# Patient Record
Sex: FEMALE | Race: BLACK OR AFRICAN AMERICAN | Employment: FULL TIME | URBAN - METROPOLITAN AREA
[De-identification: names, ages, dates, MRNs, and addresses within clinical notes are randomized per-mention and may not be internally consistent; named-entity substitution may affect disease eponyms.]

---

## 2017-07-11 ENCOUNTER — OFFICE VISIT (OUTPATIENT)
Dept: FAMILY MEDICINE CLINIC | Age: 32
End: 2017-07-11

## 2017-07-11 VITALS
TEMPERATURE: 98 F | DIASTOLIC BLOOD PRESSURE: 90 MMHG | RESPIRATION RATE: 18 BRPM | HEART RATE: 98 BPM | WEIGHT: 142 LBS | SYSTOLIC BLOOD PRESSURE: 148 MMHG | OXYGEN SATURATION: 97 % | HEIGHT: 66 IN | BODY MASS INDEX: 22.82 KG/M2

## 2017-07-11 DIAGNOSIS — R82.90 ABNORMAL URINALYSIS: ICD-10-CM

## 2017-07-11 DIAGNOSIS — N89.8 VAGINAL ITCHING: Primary | ICD-10-CM

## 2017-07-11 LAB
BILIRUB UR QL STRIP: NEGATIVE
GLUCOSE UR-MCNC: NEGATIVE MG/DL
HCG URINE, QL. (POC): NEGATIVE
KETONES P FAST UR STRIP-MCNC: NEGATIVE MG/DL
PH UR STRIP: 5.5 [PH] (ref 4.6–8)
PROT UR QL STRIP: ABNORMAL MG/DL
SP GR UR STRIP: 1.02 (ref 1–1.03)
UA UROBILINOGEN AMB POC: ABNORMAL (ref 0.2–1)
URINALYSIS CLARITY POC: ABNORMAL
URINALYSIS COLOR POC: ABNORMAL
URINE BLOOD POC: ABNORMAL
URINE LEUKOCYTES POC: ABNORMAL
URINE NITRITES POC: NEGATIVE
VALID INTERNAL CONTROL?: YES

## 2017-07-11 NOTE — PROGRESS NOTES
HISTORY OF PRESENT ILLNESS  Lowell An is a 34 y.o. female. HPI   This lady states she has had occasional vaginal itching and wants to be \"checked for BV\"  No other complaints  Denies abdominal pain and discomfort    Review of Systems   Constitutional: Negative for chills and fever. Respiratory: Negative for shortness of breath. Gastrointestinal: Negative for abdominal pain, nausea and vomiting. Musculoskeletal: Negative for myalgias. Physical Exam   Constitutional: She appears well-developed and well-nourished. No distress. Neck: Normal range of motion. Cardiovascular: Normal rate, regular rhythm and normal heart sounds. No murmur heard. Pulmonary/Chest: Effort normal and breath sounds normal. No respiratory distress. She has no wheezes. Abdominal: Soft. There is no tenderness. Genitourinary:   Genitourinary Comments: defered   Skin: She is not diaphoretic. ASSESSMENT and PLAN    ICD-10-CM ICD-9-CM    1. Vaginal itching L29.8 698.1 AMB POC URINALYSIS DIP STICK MANUAL W/O MICRO      NUSWAB VAGINITIS PLUS      AMB POC URINE PREGNANCY TEST, VISUAL COLOR COMPARISON   2.  Abnormal urinalysis R82.90 791.9 CULTURE, URINE   will follow up results  Precautions given

## 2017-07-13 LAB — BACTERIA UR CULT: NORMAL

## 2017-07-14 LAB
A VAGINAE DNA VAG QL NAA+PROBE: NORMAL SCORE
BVAB2 DNA VAG QL NAA+PROBE: NORMAL SCORE
C ALBICANS DNA VAG QL NAA+PROBE: NEGATIVE
C GLABRATA DNA VAG QL NAA+PROBE: NEGATIVE
C TRACH RRNA SPEC QL NAA+PROBE: NEGATIVE
MEGA1 DNA VAG QL NAA+PROBE: NORMAL SCORE
N GONORRHOEA RRNA SPEC QL NAA+PROBE: NEGATIVE
T VAGINALIS RRNA SPEC QL NAA+PROBE: NEGATIVE

## 2017-07-17 ENCOUNTER — TELEPHONE (OUTPATIENT)
Dept: FAMILY MEDICINE CLINIC | Age: 32
End: 2017-07-17

## 2017-07-17 NOTE — TELEPHONE ENCOUNTER
Called patient on number listed to give results of tests done per Dr. Naomy Maya, unable to leave message at this time because mailbox is full.   Cecilia Degroot LPN

## 2017-07-17 NOTE — TELEPHONE ENCOUNTER
----- Message from Tatyana Glover MD sent at 7/16/2017  6:36 PM EDT -----  Please notify pt of negative results

## 2019-01-17 PROBLEM — R74.8 ELEVATED LIVER ENZYMES: Status: ACTIVE | Noted: 2018-11-28

## 2020-06-05 ENCOUNTER — OFFICE VISIT (OUTPATIENT)
Dept: PRIMARY CARE CLINIC | Age: 35
End: 2020-06-05

## 2020-06-05 DIAGNOSIS — Z20.828 EXPOSURE TO SARS-ASSOCIATED CORONAVIRUS: Primary | ICD-10-CM

## 2020-06-05 NOTE — PROGRESS NOTES
Pt presents to covid clinic for covid testing. She states that her employer is requesting that she get tested. She denies sx and exposure at this time.

## 2020-06-07 LAB — SARS-COV-2, NAA: NOT DETECTED

## 2020-10-12 ENCOUNTER — OFFICE VISIT (OUTPATIENT)
Dept: PRIMARY CARE CLINIC | Age: 35
End: 2020-10-12

## 2020-10-12 DIAGNOSIS — Z20.822 ENCOUNTER FOR LABORATORY TESTING FOR COVID-19 VIRUS: Primary | ICD-10-CM

## 2020-10-12 NOTE — PROGRESS NOTES
Pt presents to the flu clinic for covid testing. Pt reports mild sx and no exposure. Pt declined to see a doctor today.  KT

## 2020-10-14 LAB — SARS-COV-2, NAA: NOT DETECTED

## 2021-03-15 ENCOUNTER — TELEPHONE (OUTPATIENT)
Dept: FAMILY MEDICINE CLINIC | Age: 36
End: 2021-03-15

## 2021-03-15 DIAGNOSIS — K21.9 GASTROESOPHAGEAL REFLUX DISEASE: ICD-10-CM

## 2021-03-15 RX ORDER — ERGOCALCIFEROL 1.25 MG/1
50000 CAPSULE ORAL 2 TIMES WEEKLY
Qty: 24 CAP | Refills: 1 | Status: SHIPPED | OUTPATIENT
Start: 2021-03-15 | End: 2021-05-28 | Stop reason: ALTCHOICE

## 2021-03-15 RX ORDER — FAMOTIDINE 20 MG/1
20 TABLET, FILM COATED ORAL 2 TIMES DAILY
Qty: 60 TAB | Refills: 5 | Status: SHIPPED | OUTPATIENT
Start: 2021-03-15 | End: 2021-08-09

## 2021-03-15 NOTE — TELEPHONE ENCOUNTER
Patient says she has finished the Vitamin D and wants to know if [facility] wants her to take more. [address]    Also, please refill famotidine to [facility]. Thirdly, she asks if she is due to get an \"allergy shot\" .

## 2021-03-15 NOTE — TELEPHONE ENCOUNTER
She should still be on vitamin D. I sent in 3 months worth on 2/12? She should take it until May and then come get re-checked. I will resend this in case there was a problem with the pharmacy. I will also renew her famotidine. If she is having allergy symptoms, she can schedule a VV and then come in for her injection or she can come in person to be seen when she brings her mom up here next week. If she is not having any symptoms, I would wait on the shot.

## 2021-04-13 ENCOUNTER — TELEPHONE (OUTPATIENT)
Dept: FAMILY MEDICINE CLINIC | Age: 36
End: 2021-04-13

## 2021-04-13 NOTE — TELEPHONE ENCOUNTER
----- Message from April TWIN Garcia Mi sent at 4/13/2021  3:52 PM EDT -----  Regarding: Britton Wade NP/telephone  General Message/Vendor Calls    Caller's first and last name:      Reason for call: Requested a call back       Callback required yes/no and why: Yes       Best contact number(s): [phone]      Details to clarify the request: Patient stated she sent a request to have an appointment scheduled through the patient portal and she would also like to speak with the doctor regarding her mother pain in her back. She would like a call back ASAP.        April [address] [facility] [place]

## 2021-04-15 ENCOUNTER — VIRTUAL VISIT (OUTPATIENT)
Dept: FAMILY MEDICINE CLINIC | Age: 36
End: 2021-04-15
Payer: COMMERCIAL

## 2021-04-15 ENCOUNTER — DOCUMENTATION ONLY (OUTPATIENT)
Dept: FAMILY MEDICINE CLINIC | Age: 36
End: 2021-04-15

## 2021-04-15 VITALS — DIASTOLIC BLOOD PRESSURE: 96 MMHG | SYSTOLIC BLOOD PRESSURE: 156 MMHG

## 2021-04-15 DIAGNOSIS — B37.31 VULVOVAGINAL CANDIDIASIS: ICD-10-CM

## 2021-04-15 DIAGNOSIS — J30.1 SEASONAL ALLERGIC RHINITIS DUE TO POLLEN: Primary | ICD-10-CM

## 2021-04-15 DIAGNOSIS — R03.0 ELEVATED BLOOD-PRESSURE READING WITHOUT DIAGNOSIS OF HYPERTENSION: ICD-10-CM

## 2021-04-15 PROCEDURE — 99214 OFFICE O/P EST MOD 30 MIN: CPT | Performed by: NURSE PRACTITIONER

## 2021-04-15 PROCEDURE — 96372 THER/PROPH/DIAG INJ SC/IM: CPT | Performed by: NURSE PRACTITIONER

## 2021-04-15 RX ORDER — DEXAMETHASONE SODIUM PHOSPHATE 10 MG/ML
4 INJECTION INTRAMUSCULAR; INTRAVENOUS ONCE
Qty: 0.4 ML | Refills: 0
Start: 2021-04-15 | End: 2021-04-15

## 2021-04-15 RX ORDER — FLUCONAZOLE 150 MG/1
TABLET ORAL
Qty: 2 TAB | Refills: 0 | Status: SHIPPED | OUTPATIENT
Start: 2021-04-15 | End: 2021-07-09 | Stop reason: ALTCHOICE

## 2021-04-15 RX ORDER — METHYLPREDNISOLONE ACETATE 40 MG/ML
40 INJECTION, SUSPENSION INTRA-ARTICULAR; INTRALESIONAL; INTRAMUSCULAR; SOFT TISSUE ONCE
Qty: 1 VIAL | Refills: 0
Start: 2021-04-15 | End: 2021-04-15

## 2021-04-15 NOTE — PROGRESS NOTES
Chief Complaint   Patient presents with    Medication Refill     Diflucan RX     1. Have you been to the ER, urgent care clinic since your last visit? Hospitalized since your last visit? No    2. Have you seen or consulted any other health care providers outside of the [address] [place] [address] since your last visit? Include any pap smears or colon screening. No  Fall Risk Assessment, last 12 mths 4/15/2021   Able to walk? Yes   Fall in past 12 months? 0   Do you feel unsteady? 0   Are you worried about falling 0       Abuse Screening Questionnaire 4/15/2021   Do you ever feel afraid of your partner? N   Are you in a relationship with someone who physically or mentally threatens you? N   Is it safe for you to go home?  Y     3 most recent PHQ Screens 4/15/2021   Little interest or pleasure in doing things Not at all   Feeling down, depressed, irritable, or hopeless Not at all   Total Score PHQ 2 0     Learning Assessment 10/13/2020   PRIMARY LEARNER Patient   HIGHEST LEVEL OF EDUCATION - PRIMARY LEARNER  GRADUATED HIGH SCHOOL OR GED   BARRIERS PRIMARY LEARNER NONE     -   CO-LEARNER CAREGIVER No   PRIMARY LANGUAGE ENGLISH   LEARNER PREFERENCE PRIMARY READING     -   ANSWERED BY Patient   RELATIONSHIP SELF

## 2021-04-15 NOTE — PROGRESS NOTES
Caryl Shelby is a 39 y.o. female who was seen by synchronous (real-time) audio-video technology on 4/15/2021. This encounter was conducted via [employer] me. Consent:  She and/or her healthcare decision maker is aware that this patient-initiated Telehealth encounter is a billable service, with coverage as determined by her insurance carrier. She is aware that she may receive a bill and has provided verbal consent to proceed: Yes    I was in the office while conducting this encounter. 712  Subjective:   HPI: Caryl Shelby was seen for Medication Refill (Diflucan RX)    Allergic rhinitis: Currently flaring, notes sinus congestion, PND, occasional cough, and some nausea.     Chest pain: Improved. Recurrent BV: Patient has been treated multiple times a year for several years for BV, previously followed at [facility]. Using Metrogel as needed.     New issues: She has a few concerns. Firstly, she reports vaginal discharge and itching. She has Metrogel at home to use as needed. Concerned about a yeast infection. She has also had an intermittent headache for the past two weeks. It improves with tension headache medication and rest. She started monitoring her BP and it is running in the 870J systolic, 491K diastolic. Allergies   Allergen Reactions    Amoxicillin Unknown (comments)       Current Outpatient Medications   Medication Sig    methylPREDNISolone acetate (DEPO-MEDROL) 40 mg/mL injection 1 mL by IntraMUSCular route once for 1 dose.  dexamethasone, PF, (DECADRON) 10 mg/mL injection 0.4 mL by IntraMUSCular route once for 1 dose.  fluconazole (DIFLUCAN) 150 mg tablet 1 tab PO on day 1, may repeat on day 4 if symptoms persist.    ergocalciferol (ERGOCALCIFEROL) 1,250 mcg (50,000 unit) capsule Take 1 Cap by mouth two (2) times a week.  famotidine (PEPCID) 20 mg tablet Take 1 Tab by mouth two (2) times a day.  montelukast (SINGULAIR) 10 mg tablet Take 1 Tab by mouth daily.     Cetirizine (ZYRTEC) 10 mg cap Take  by mouth.  fluticasone propionate (FLONASE NA) by Nasal route.  levonorgestrel (MIRENA) 20 mcg/24 hr (5 years) IUD 1 Each by IntraUTERine route once. No current facility-administered medications for this visit. Past Medical History:   Diagnosis Date    Human papillomavirus in conditions classified elsewhere and of unspecified site 2006    Cervix    Menarche age 14    Migraine without aura, without mention of intractable migraine without mention of status migrainosus 2009       Family History   Problem Relation Age of Onset    Elevated Lipids Mother         elevated cholesterol    Hypertension Maternal Aunt        ROS:  Denies fever, chills, + cough, chest pain, SOB,  nausea, vomiting, diarrhea, dysuria. Denies rashes, wounds, arthralgias, weakness, numbness, visual changes, depression. Denies wt loss, wt gain, hemoptysis, hematochezia or melena. Patient is not experiencing chest pain radiating to the jaw and/or down the arms. PHYSICAL EXAMINATION:    Vital Signs: (As obtained by patient/caregiver at home)  There were no vitals taken for this visit.      Constitutional: [x] Appears well-developed and well-nourished [x] No apparent distress      [] Abnormal -     Mental status: [x] Alert and awake  [x] Oriented to person/place/time [x] Able to follow commands    [] Abnormal -     Eyes:   EOM    [x]  Normal    [] Abnormal -   Sclera  [x]  Normal    [] Abnormal -          Discharge [x]  None visible   [] Abnormal -     HENT: [x] Normocephalic, atraumatic  [] Abnormal -   [x] Mouth/Throat: Mucous membranes are moist    External Ears [x] Normal  [] Abnormal -    Neck: [x] No visualized mass [] Abnormal -     Pulmonary/Chest: [x] Respiratory effort normal   [x] No visualized signs of difficulty breathing or respiratory distress        [] Abnormal -      Musculoskeletal:   [] Normal gait with no signs of ataxia         [x] Normal range of motion of neck        [] Abnormal -     Neurological:        [x] No Facial Asymmetry (Cranial nerve 7 motor function) (limited exam due to video visit)          [x] No gaze palsy        [] Abnormal -          Skin:        [x] No significant exanthematous lesions or discoloration noted on facial skin         [] Abnormal -            Psychiatric:       [x] Normal Affect [] Abnormal -        [x] No Hallucinations    Other pertinent observable physical exam findings:-        Assessment & Plan:       ICD-10-CM ICD-9-CM    1. Seasonal allergic rhinitis due to pollen  J30.1 477.0 METHYLPREDNISOLONE ACETATE INJECTION 40 MG      NE THER/PROPH/DIAG INJECTION, SUBCUT/IM      methylPREDNISolone acetate (DEPO-MEDROL) 40 mg/mL injection      dexamethasone, PF, (DECADRON) 10 mg/mL injection      DEXAMETHASONE SODIUM PHOSPHATE INJECTION 1 MG   2. Vulvovaginal candidiasis  B37.3 112.1 fluconazole (DIFLUCAN) 150 mg tablet   3. Elevated blood-pressure reading without diagnosis of hypertension  R03.0 796.2      Home BPs are up. Come in today for a BP check. Steroid injection in the office as this has worked well in the past. Rx diflucan. Let's re-group on Monday and review BPs after allergy symptoms have improved. Avoid decongestants which may raise BP. Continue antihistamines, Singulair. Patient aware of plan of care and verbalized understanding. Questions answered. RTC Monday, or sooner if needed. We discussed the expected course, resolution and complications of the diagnosis(es) in detail. Medication risks, benefits, costs, interactions, and alternatives were discussed as indicated. I advised her to contact the office if her condition worsens, changes or fails to improve as anticipated. She expressed understanding with the diagnosis(es) and plan.      Pursuant to the emergency declaration under the [address], [address] [place] [address] and the [employer] and [employer] Act, this Virtual  Visit was conducted, with patient's consent, to reduce the patient's risk of exposure to COVID-19 and provide continuity of care for an established patient. Services were provided through a video synchronous discussion virtually to substitute for in-person clinic visit.     Doc Johnson NP

## 2021-04-15 NOTE — PATIENT INSTRUCTIONS
Allergies: Care Instructions Your Care Instructions Allergies occur when your body's defense system (immune system) overreacts to certain substances. The immune system treats a harmless substance as if it were a harmful germ or virus. Many things can make this happen. These include pollens, medicine, food, dust, animal dander, and mold. Allergies can be mild or severe. Mild allergies can be managed with home treatment. But medicine may be needed to prevent problems. Managing your allergies is an important part of staying healthy. Your doctor may suggest that you have allergy testing to help find out what is causing your allergies. Severe allergies can cause reactions that affect your whole body (anaphylactic reactions). Your doctor may prescribe a shot of epinephrine to carry with you in case you have a severe reaction. Learn how to give yourself the shot and keep it with you at all times. Make sure it is not . Follow-up care is a key part of your treatment and safety. Be sure to make and go to all appointments, and call your doctor if you are having problems. It's also a good idea to know your test results and keep a list of the medicines you take. How can you care for yourself at home? · If you have been told by your doctor that dust or dust mites are causing your allergy, decrease the dust around your bed: 
? Wash sheets, pillowcases, and other bedding in hot water every week. ? Use dust-proof covers for pillows, duvets, and mattresses. Avoid plastic covers because they tear easily and do not \"breathe. \" Wash as instructed on the label. ? Do not use any blankets and pillows that you do not need. ? Use blankets that you can wash in your washing machine. ? Consider removing drapes and carpets, which attract and hold dust, from your bedroom. · If you are allergic to house dust and mites, do not use home humidifiers. Your doctor can suggest ways you can control dust and mites.  
· Look for signs of cockroaches. Cockroaches cause allergic reactions. Use cockroach baits to get rid of them. Then, clean your home well. Cockroaches like areas where grocery bags, newspapers, empty bottles, or cardboard boxes are stored. Do not keep these inside your home, and keep trash and food containers sealed. Seal off any spots where cockroaches might enter your home. · If you are allergic to mold, get rid of furniture, rugs, and drapes that smell musty. Check for mold in the bathroom. · If you are allergic to outdoor pollen or mold spores, use air-conditioning. Change or clean all filters every month. Keep windows closed. · If you are allergic to pollen, stay inside when pollen counts are high. Use a vacuum  with a HEPA filter or a double-thickness filter at least two times each week. · Stay inside when air pollution is bad. Avoid paint fumes, perfumes, and other strong odors. · Avoid conditions that make your allergies worse. Stay away from smoke. Do not smoke or let anyone else smoke in your house. Do not use fireplaces or wood-burning stoves. · If you are allergic to your pets, change the air filter in your furnace every month. Use high-efficiency filters. · If you are allergic to pet dander, keep pets outside or out of your bedroom. Old carpet and cloth furniture can hold a lot of animal dander. You may need to replace them. When should you call for help? Give an epinephrine shot if: 
  · You think you are having a severe allergic reaction.  
  · You have symptoms in more than one body area, such as mild nausea and an itchy mouth. After giving an epinephrine shot call 911, even if you feel better. Call 911 if: 
  · You have symptoms of a severe allergic reaction. These may include: 
? Sudden raised, red areas (hives) all over your body. ? Swelling of the throat, mouth, lips, or tongue. ? Trouble breathing. ? Passing out (losing consciousness).  Or you may feel very lightheaded or suddenly feel weak, confused, or restless.  
  · You have been given an epinephrine shot, even if you feel better. Call your doctor now or seek immediate medical care if: 
  · You have symptoms of an allergic reaction, such as: ? A rash or hives (raised, red areas on the skin). ? Itching. ? Swelling. ? Belly pain, nausea, or vomiting. Watch closely for changes in your health, and be sure to contact your doctor if: 
  · You do not get better as expected. Where can you learn more? Go to http://www.gray.com/ Enter M423 in the search box to learn more about \"Allergies: Care Instructions. \" Current as of: November 6, 2020               Content Version: 12.8 © 2006-2021 [employer]wise[employer]. Care instructions adapted under license by [employer] (which disclaims liability or warranty for this information). If you have questions about a medical condition or this instruction, always ask your healthcare professional. [place] [address] any warranty or liability for your use of this information.

## 2021-04-15 NOTE — PROGRESS NOTES
BP check:  1453 162/98 Right arm  1458 156/96 Left arm    Pt advised to continue checking BP at home and bring BP log on Monday. Pt expressed understanding.  KT

## 2021-04-19 ENCOUNTER — TELEPHONE (OUTPATIENT)
Dept: FAMILY MEDICINE CLINIC | Age: 36
End: 2021-04-19

## 2021-04-19 ENCOUNTER — CLINICAL SUPPORT (OUTPATIENT)
Dept: FAMILY MEDICINE CLINIC | Age: 36
End: 2021-04-19
Payer: COMMERCIAL

## 2021-04-19 DIAGNOSIS — I10 ESSENTIAL HYPERTENSION: ICD-10-CM

## 2021-04-19 DIAGNOSIS — E55.9 VITAMIN D DEFICIENCY: Primary | ICD-10-CM

## 2021-04-19 PROCEDURE — 90000 COLLECTION VENOUS BLOOD,VENIPUNCTURE: CPT | Performed by: NURSE PRACTITIONER

## 2021-04-19 RX ORDER — LISINOPRIL 5 MG/1
5 TABLET ORAL DAILY
Qty: 30 TAB | Refills: 0 | Status: SHIPPED | OUTPATIENT
Start: 2021-04-19 | End: 2021-05-19 | Stop reason: SDUPTHER

## 2021-04-19 NOTE — LETTER
NOTIFICATION RETURN TO WORK / SCHOOL 
 
4/19/2021 2:11 PM 
 
Ms. Nan Tate [address] [place] [address]. Via [employer] To Whom It May Concern: 
 
Nan Tate is currently under the care of Feroz Weathers. She will return to work/school on: 4/22/21 If there are questions or concerns please have the patient contact our office. Sincerely, Jerardo Nurse

## 2021-04-19 NOTE — PROGRESS NOTES
Patient in today to accompany her mom. Home BP log has systolics 919V and diastolics in the 627B. Rx lisinopril 5 mg. Take daily. F/u in 1 month or via MyChart, Continue home BP monitoring. Labs as ordered.

## 2021-04-19 NOTE — TELEPHONE ENCOUNTER
I sent in lisinopril to be taken once a day to [facility]. In [facility].

## 2021-04-20 LAB
25(OH)D3 SERPL-MCNC: 54.1 NG/ML (ref 30–100)
ANION GAP SERPL CALC-SCNC: 6 MMOL/L (ref 5–15)
BUN SERPL-MCNC: 9 MG/DL (ref 6–20)
BUN/CREAT SERPL: 11 (ref 12–20)
CALCIUM SERPL-MCNC: 9.6 MG/DL (ref 8.5–10.1)
CHLORIDE SERPL-SCNC: 107 MMOL/L (ref 97–108)
CO2 SERPL-SCNC: 26 MMOL/L (ref 21–32)
CREAT SERPL-MCNC: 0.81 MG/DL (ref 0.55–1.02)
GLUCOSE SERPL-MCNC: 106 MG/DL (ref 65–100)
POTASSIUM SERPL-SCNC: 4 MMOL/L (ref 3.5–5.1)
SODIUM SERPL-SCNC: 139 MMOL/L (ref 136–145)

## 2021-05-19 ENCOUNTER — TELEPHONE (OUTPATIENT)
Dept: FAMILY MEDICINE CLINIC | Age: 36
End: 2021-05-19

## 2021-05-19 DIAGNOSIS — I10 ESSENTIAL HYPERTENSION: ICD-10-CM

## 2021-05-19 RX ORDER — LISINOPRIL 5 MG/1
5 TABLET ORAL DAILY
Qty: 30 TABLET | Refills: 2 | Status: SHIPPED | OUTPATIENT
Start: 2021-05-19 | End: 2021-05-28 | Stop reason: SDUPTHER

## 2021-05-19 NOTE — TELEPHONE ENCOUNTER
----- Message from Veronica Pollard sent at 5/19/2021  9:50 AM EDT -----  Regarding: NP Abilio/Telephone  General Message/Vendor Calls    Caller's first and last name: pt      Reason for call: Question      Callback required yes/no and why: yes      Best contact number(s): [phone]      Details to clarify the request: Pt is out of BP medication and wants see if they pt to get her BP checked before requesting a refill?       Veronica Pollard

## 2021-05-20 NOTE — TELEPHONE ENCOUNTER
Kenrick Friday responded via [employer] yesterday. Rx sent in. Make an appt to see me in the next 30 days.

## 2021-05-28 ENCOUNTER — OFFICE VISIT (OUTPATIENT)
Dept: FAMILY MEDICINE CLINIC | Age: 36
End: 2021-05-28
Payer: COMMERCIAL

## 2021-05-28 VITALS
HEART RATE: 82 BPM | WEIGHT: 174 LBS | DIASTOLIC BLOOD PRESSURE: 78 MMHG | RESPIRATION RATE: 20 BRPM | TEMPERATURE: 97.6 F | OXYGEN SATURATION: 98 % | SYSTOLIC BLOOD PRESSURE: 140 MMHG | BODY MASS INDEX: 28.99 KG/M2 | HEIGHT: 65 IN

## 2021-05-28 DIAGNOSIS — I10 ESSENTIAL HYPERTENSION: ICD-10-CM

## 2021-05-28 DIAGNOSIS — N89.8 VAGINAL ITCHING: Primary | ICD-10-CM

## 2021-05-28 PROCEDURE — 99214 OFFICE O/P EST MOD 30 MIN: CPT | Performed by: NURSE PRACTITIONER

## 2021-05-28 RX ORDER — METRONIDAZOLE 7.5 MG/G
GEL VAGINAL
COMMUNITY
Start: 2021-04-01 | End: 2021-05-28 | Stop reason: SDUPTHER

## 2021-05-28 RX ORDER — METRONIDAZOLE 7.5 MG/G
1 GEL VAGINAL DAILY
Qty: 25 G | Refills: 0 | Status: SHIPPED | OUTPATIENT
Start: 2021-05-28 | End: 2021-06-02

## 2021-05-28 RX ORDER — LISINOPRIL 10 MG/1
10 TABLET ORAL DAILY
Qty: 30 TABLET | Refills: 2 | Status: SHIPPED | OUTPATIENT
Start: 2021-05-28 | End: 2021-07-09 | Stop reason: SDUPTHER

## 2021-05-28 NOTE — PATIENT INSTRUCTIONS
High Blood Pressure: Care Instructions  Overview     It's normal for blood pressure to go up and down throughout the day. But if it stays up, you have high blood pressure. Another name for high blood pressure is hypertension. Despite what a lot of people think, high blood pressure usually doesn't cause headaches or make you feel dizzy or lightheaded. It usually has no symptoms. But it does increase your risk of stroke, heart attack, and other problems. You and your doctor will talk about your risks of these problems based on your blood pressure. Your doctor will give you a goal for your blood pressure. Your goal will be based on your health and your age. Lifestyle changes, such as eating healthy and being active, are always important to help lower blood pressure. You might also take medicine to reach your blood pressure goal.  Follow-up care is a key part of your treatment and safety. Be sure to make and go to all appointments, and call your doctor if you are having problems. It's also a good idea to know your test results and keep a list of the medicines you take. How can you care for yourself at home? Medical treatment  · If you stop taking your medicine, your blood pressure will go back up. You may take one or more types of medicine to lower your blood pressure. Be safe with medicines. Take your medicine exactly as prescribed. Call your doctor if you think you are having a problem with your medicine. · Talk to your doctor before you start taking aspirin every day. Aspirin can help certain people lower their risk of a heart attack or stroke. But taking aspirin isn't right for everyone, because it can cause serious bleeding. · See your doctor regularly. You may need to see the doctor more often at first or until your blood pressure comes down. · If you are taking blood pressure medicine, talk to your doctor before you take decongestants or anti-inflammatory medicine, such as ibuprofen.  Some of these medicines can raise blood pressure. · Learn how to check your blood pressure at home. Lifestyle changes  · Stay at a healthy weight. This is especially important if you put on weight around the waist. Losing even 10 pounds can help you lower your blood pressure. · If your doctor recommends it, get more exercise. Walking is a good choice. Bit by bit, increase the amount you walk every day. Try for at least 30 minutes on most days of the week. You also may want to swim, bike, or do other activities. · Avoid or limit alcohol. Talk to your doctor about whether you can drink any alcohol. · Try to limit how much sodium you eat to less than 2,300 milligrams (mg) a day. Your doctor may ask you to try to eat less than 1,500 mg a day. · Eat plenty of fruits (such as bananas and oranges), vegetables, legumes, whole grains, and low-fat dairy products. · Lower the amount of saturated fat in your diet. Saturated fat is found in animal products such as milk, cheese, and meat. Limiting these foods may help you lose weight and also lower your risk for heart disease. · Do not smoke. Smoking increases your risk for heart attack and stroke. If you need help quitting, talk to your doctor about stop-smoking programs and medicines. These can increase your chances of quitting for good. When should you call for help? Call  911 anytime you think you may need emergency care. This may mean having symptoms that suggest that your blood pressure is causing a serious heart or blood vessel problem. Your blood pressure may be over 180/120. For example, call 911 if:    · You have symptoms of a heart attack. These may include:  ? Chest pain or pressure, or a strange feeling in the chest.  ? Sweating. ? Shortness of breath. ? Nausea or vomiting. ? Pain, pressure, or a strange feeling in the back, neck, jaw, or upper belly or in one or both shoulders or arms. ? Lightheadedness or sudden weakness.   ? A fast or irregular heartbeat.     · You have symptoms of a stroke. These may include:  ? Sudden numbness, tingling, weakness, or loss of movement in your face, arm, or leg, especially on only one side of your body. ? Sudden vision changes. ? Sudden trouble speaking. ? Sudden confusion or trouble understanding simple statements. ? Sudden problems with walking or balance. ? A sudden, severe headache that is different from past headaches.     · You have severe back or belly pain. Do not wait until your blood pressure comes down on its own. Get help right away. Call your doctor now or seek immediate care if:    · Your blood pressure is much higher than normal (such as 180/120 or higher), but you don't have symptoms.     · You think high blood pressure is causing symptoms, such as:  ? Severe headache.  ? Blurry vision. Watch closely for changes in your health, and be sure to contact your doctor if:    · Your blood pressure measures higher than your doctor recommends at least 2 times. That means the top number is higher or the bottom number is higher, or both.     · You think you may be having side effects from your blood pressure medicine. Where can you learn more? Go to http://www.gray.com/  Enter [id number] in the search box to learn more about \"High Blood Pressure: Care Instructions. \"  Current as of: August 31, 2020               Content Version: 12.8  © 2006-2021 [employer]. Care instructions adapted under license by [employer] (which disclaims liability or warranty for this information). If you have questions about a medical condition or this instruction, always ask your healthcare professional. [place] [address] any warranty or liability for your use of this information.

## 2021-05-28 NOTE — PROGRESS NOTES
Chief Complaint   Patient presents with    Blood Pressure Check       HPI:    Carley Peck is a 39 y.o. female who presents today for a follow up appointment. Allergic rhinitis: Stable, well controlled.      Chest pain: Improved. Recurrent BV: Patient has been treated multiple times a year for several years for BV, previously followed at [facility]. Using Metrogel as needed. Needs a refill. IUD in place.     HTN: Started on lisinopril 5 mg about 6 weeks ago. She has not been checking her BP at home but she notes it was elevated at her dentist's office a few weeks ago, 930 systolic. New issues: As above. Allergies   Allergen Reactions    Amoxicillin Unknown (comments)       Current Outpatient Medications   Medication Sig    lisinopriL (PRINIVIL, ZESTRIL) 10 mg tablet Take 1 Tablet by mouth daily.  metroNIDAZOLE (METROGEL) 0.75 % gel Insert 5 g into vagina daily for 5 days.  fluconazole (DIFLUCAN) 150 mg tablet 1 tab PO on day 1, may repeat on day 4 if symptoms persist.    famotidine (PEPCID) 20 mg tablet Take 1 Tab by mouth two (2) times a day.  montelukast (SINGULAIR) 10 mg tablet Take 1 Tab by mouth daily.  Cetirizine (ZYRTEC) 10 mg cap Take  by mouth.  fluticasone propionate (FLONASE NA) by Nasal route.  levonorgestrel (MIRENA) 20 mcg/24 hr (5 years) IUD 1 Each by IntraUTERine route once. No current facility-administered medications for this visit. Past Medical History:   Diagnosis Date    Human papillomavirus in conditions classified elsewhere and of unspecified site 2006    Cervix    Menarche age 14    Migraine without aura, without mention of intractable migraine without mention of status migrainosus 2009       Family History   Problem Relation Age of Onset    Elevated Lipids Mother         elevated cholesterol    Hypertension Maternal Aunt        ROS:  Denies fever, chills, cough, chest pain, SOB,  nausea, vomiting, diarrhea, dysuria.  Denies rashes, wounds, arthralgias, weakness, numbness, visual changes, depression. Denies wt loss, wt gain, hemoptysis, hematochezia or melena. Patient is not experiencing chest pain radiating to the jaw and/or down the arms. Physical Examination:    BP (!) 140/78 (BP 1 Location: Right arm, BP Patient Position: Sitting, BP Cuff Size: Adult)   Pulse 82   Temp 97.6 °F (36.4 °C) (Temporal)   Resp 20   Ht 5' 5\" (1.651 m)   Wt 174 lb (78.9 kg)   SpO2 98%   BMI 28.96 kg/m²     Wt Readings from Last 3 Encounters:   05/28/21 174 lb (78.9 kg)   10/13/20 172 lb 6.4 oz (78.2 kg)   08/14/20 169 lb 12.8 oz (77 kg)     Constitutional: WDWN Female in no acute distress  HENT:  NC/AT  EYES: EOMI, PERRL  Neck:  Supple, no JVD, mass or bruit. No thyromegaly. Respiratory:  Respirations even and unlabored without use of accessory muscles, CTA throughout without wheezes, rales, rubs or rhonchi. Symmetrical chest expansion. Cardiac: RRR no clicks, murmurs, gallops, or rubs   Musculoskeletal:  No cyanosis, clubbing or edema of extremities. Moves all extremities without difficulty. Neurologic:  Smooth, even gait without assistance, CN 2-12 grossly intact. Skin: intact and warm to the touch, no rash   Lymphadenopathy: no cervical or supraclavicular nodes  Psych: Pleasant and appropriate. Judgment normal. Alert and oriented x 3. ASSESSMENT AND PLAN:       ICD-10-CM ICD-9-CM    1. Vaginal itching  N89.8 698.1    2. Essential hypertension  I10 401.9 lisinopriL (PRINIVIL, ZESTRIL) 10 mg tablet     BP elevated x 2. Increase lisinopril to 10 mg. Work on stress reduction over the summer. Use BP cuff at home to check BP periodically. Renew Metrogel. If no improvement, RTC or see GYN for pelvic exam.     Patient aware of plan of care and verbalized understanding. Questions answered. RTC in July for a check up with labs, or sooner if needed.     Jay Jay Lindsey NP

## 2021-06-10 ENCOUNTER — VIRTUAL VISIT (OUTPATIENT)
Dept: FAMILY MEDICINE CLINIC | Age: 36
End: 2021-06-10
Payer: COMMERCIAL

## 2021-06-10 DIAGNOSIS — B00.1 HERPES LABIALIS: ICD-10-CM

## 2021-06-10 DIAGNOSIS — J06.9 VIRAL UPPER RESPIRATORY TRACT INFECTION: Primary | ICD-10-CM

## 2021-06-10 PROCEDURE — 99214 OFFICE O/P EST MOD 30 MIN: CPT | Performed by: NURSE PRACTITIONER

## 2021-06-10 RX ORDER — VALACYCLOVIR HYDROCHLORIDE 1 G/1
TABLET, FILM COATED ORAL
Qty: 20 TABLET | Refills: 1 | Status: SHIPPED | OUTPATIENT
Start: 2021-06-10

## 2021-06-10 RX ORDER — METHYLPREDNISOLONE 4 MG/1
TABLET ORAL
Qty: 1 DOSE PACK | Refills: 0 | Status: SHIPPED | OUTPATIENT
Start: 2021-06-10 | End: 2021-07-09 | Stop reason: ALTCHOICE

## 2021-06-10 NOTE — PROGRESS NOTES
Juanita Godoy is a 39 y.o. female who was seen by synchronous (real-time) audio-video technology on 6/10/2021. This encounter was conducted via D[employer]. me. Consent:  She and/or her healthcare decision maker is aware that this patient-initiated Telehealth encounter is a billable service, with coverage as determined by her insurance carrier. She is aware that she may receive a bill and has provided verbal consent to proceed: Yes    I was in the office while conducting this encounter. 712  Subjective:   HPI: Juanita Godoy was seen for Ear Pain    Allergic rhinitis: Stable, well controlled.      Chest pain: Improved. Recurrent BV: Patient has been treated multiple times a year for several years for BV, previously followed at [facility]. Using Metrogel as needed. Needs a refill. IUD in place.     HTN: Now on lisinopril 10 mg.     New issues: Seen today with a CC of L sided sore throat, L ear pain, and a fever blister. Symptoms started on Monday. She also notes some diarrhea, nausea yesterday. Denies fever. Had her second dose of Wayne Peter on 6/1/21. No sick contacts. Allergies   Allergen Reactions    Amoxicillin Unknown (comments)       Current Outpatient Medications   Medication Sig    methylPREDNISolone (MEDROL DOSEPACK) 4 mg tablet Take as directed.  valACYclovir (VALTREX) 1 gram tablet 2 tabs PO twice daily for 1 day PRN fever blisters    metroNIDAZOLE (METROGEL) 0.75 % gel INSERT ON APPLICATORFUL INTO VAGINA NIGHTLY FOR 5 DAYS    lisinopriL (PRINIVIL, ZESTRIL) 10 mg tablet Take 1 Tablet by mouth daily.  fluconazole (DIFLUCAN) 150 mg tablet 1 tab PO on day 1, may repeat on day 4 if symptoms persist.    famotidine (PEPCID) 20 mg tablet Take 1 Tab by mouth two (2) times a day.  montelukast (SINGULAIR) 10 mg tablet Take 1 Tab by mouth daily.  Cetirizine (ZYRTEC) 10 mg cap Take  by mouth.  fluticasone propionate (FLONASE NA) by Nasal route.     levonorgestrel (MIRENA) 20 mcg/24 hr (5 years) IUD 1 Each by IntraUTERine route once. No current facility-administered medications for this visit. Past Medical History:   Diagnosis Date    Human papillomavirus in conditions classified elsewhere and of unspecified site 2006    Cervix    Menarche age 14    Migraine without aura, without mention of intractable migraine without mention of status migrainosus 2009       Family History   Problem Relation Age of Onset    Elevated Lipids Mother         elevated cholesterol    Hypertension Maternal Aunt        ROS:  Denies fever, chills, cough, chest pain, SOB,  nausea, vomiting, + diarrhea, dysuria. Denies rashes, wounds, arthralgias, weakness, numbness, visual changes, depression. Denies wt loss, wt gain, hemoptysis, hematochezia or melena. Patient is not experiencing chest pain radiating to the jaw and/or down the arms. PHYSICAL EXAMINATION:    Vital Signs: (As obtained by patient/caregiver at home)  There were no vitals taken for this visit.      Constitutional: [x] Appears well-developed and well-nourished [x] No apparent distress      [] Abnormal -     Mental status: [x] Alert and awake  [x] Oriented to person/place/time [x] Able to follow commands    [] Abnormal -     Eyes:   EOM    [x]  Normal    [] Abnormal -   Sclera  [x]  Normal    [] Abnormal -          Discharge [x]  None visible   [] Abnormal -     HENT: [x] Normocephalic, atraumatic  [] Abnormal -   [] Mouth/Throat: Mucous membranes are moist. Discrete fever blister noted to R upper lip  External Ears [x] Normal  [] Abnormal -    Neck: [x] No visualized mass [] Abnormal -     Pulmonary/Chest: [x] Respiratory effort normal   [x] No visualized signs of difficulty breathing or respiratory distress        [] Abnormal -      Musculoskeletal:   [x] Normal gait with no signs of ataxia         [x] Normal range of motion of neck        [] Abnormal -     Neurological:        [x] No Facial Asymmetry (Cranial nerve 7 motor function) (limited exam due to video visit)          [x] No gaze palsy        [] Abnormal -          Skin:        [x] No significant exanthematous lesions or discoloration noted on facial skin         [] Abnormal -            Psychiatric:       [x] Normal Affect [] Abnormal -        [x] No Hallucinations    Other pertinent observable physical exam findings:-        Assessment & Plan:       ICD-10-CM ICD-9-CM    1. Viral upper respiratory tract infection  J06.9 465.9 methylPREDNISolone (MEDROL DOSEPACK) 4 mg tablet   2. Herpes labialis  B00.1 054.9 valACYclovir (VALTREX) 1 gram tablet     Discussed viral nature of symptoms. Tx as above. Hold on to valacyclovir to use at the onset of her next fever blister. Increase fluids, rest, use OTC Tylenol and/or Motrin as directed on package insert for aches/fever. May use OTC antihistamines/decongestants as directed. Call if no improvement in 5-7 days. Patient aware of plan of care and verbalized understanding. Questions answered. RTC as scheduled, or sooner if needed. We discussed the expected course, resolution and complications of the diagnosis(es) in detail. Medication risks, benefits, costs, interactions, and alternatives were discussed as indicated. I advised her to contact the office if her condition worsens, changes or fails to improve as anticipated. She expressed understanding with the diagnosis(es) and plan. Pursuant to the emergency declaration under the [facility] waiver authority and the "[employer]" and [employer] General Act, this Virtual  Visit was conducted, with patient's consent, to reduce the patient's risk of exposure to COVID-19 and provide continuity of care for an established patient. Services were provided through a video synchronous discussion virtually to substitute for in-person clinic visit.     Robert Blake NP

## 2021-06-10 NOTE — PATIENT INSTRUCTIONS
Cold Sores: Care Instructions Your Care Instructions Cold sores are clusters of small blisters on the lip and skin around or inside the mouth. Often the first sign of a cold sore is a spot that tingles, burns, or itches. A blister usually forms within 24 hours. The skin around the blisters can be red and inflamed. The blisters can break open, weep a clear fluid, and then scab over after a few days. Cold sores most often heal in 7 to 10 days without a scar. They are sometimes called fever blisters. Cold sores are caused by a virus called the herpes simplex virus. This virus also causes some cases of genital herpes. The virus can spread from a sore in the genital area to the lips. Or it can spread from a cold sore on the lips to the genital area. Cold sores most often go away on their own. But if they are severe, embarrass you, or cause pain, your doctor may prescribe antiviral medicine to relieve pain and help prevent outbreaks. Follow-up care is a key part of your treatment and safety. Be sure to make and go to all appointments, and call your doctor if you are having problems. It's also a good idea to know your test results and keep a list of the medicines you take. How can you care for yourself at home? · Wash your hands often. And try not to touch your cold sores. This will help to avoid spreading the virus to your eyes or genital area, or to other people. This is more likely to happen if this is your first cold sore outbreak. · Place ice or a cool, wet towel on the sores 3 times a day. Do this for 20 minutes each time. It may help to reduce redness and swelling. · If you are just getting a cold sore, try over-the-counter docosanol (Abreva) cream to reduce symptoms. · If your doctor prescribed antiviral medicine to relieve pain and help prevent outbreaks, be sure to follow the directions.  
· Take an over-the-counter pain medicine, such as acetaminophen (Tylenol), ibuprofen (Advil, Motrin), or naproxen (Aleve), as needed. Read and follow all instructions on the label. · Do not take two or more pain medicines at the same time unless the doctor told you to. Many pain medicines have acetaminophen, which is Tylenol. Too much acetaminophen (Tylenol) can be harmful. · Avoid citrus fruit, tomatoes, and other foods that contain acid. · Use over-the-counter ointments to numb sore areas in the mouth or on the lips. These include Orajel and Anbesol. · Do not kiss or have oral sex with anyone while you have a cold sore. To prevent cold sores in the future · Avoid long exposure of your lips to the sun. (Wear a hat to help shade your mouth.) · Do not kiss or have oral sex with someone who has a cold sore. Do not have sex or oral sex with someone who has a genital herpes outbreak. · Using lip balm that contains sunscreen may help reduce outbreaks of cold sores. · Do not share towels, razors, silverware, toothbrushes, or other objects with a person who has a cold sore. When should you call for help? Call your doctor now or seek immediate medical care if: 
  · Your symptoms are painful and you want to try antiviral medicine.  
  · You have signs of infection, such as: 
? Increased pain, swelling, warmth, or redness. ? Red streaks leading from a cold sore. ? Pus draining from a cold sore. ? A fever.  
  · You have a cold sore and develop eye pain, eye discharge, or any changes in your vision. Watch closely for changes in your health, and be sure to contact your doctor if: 
  · The cold sore does not heal in 7 to 10 days.  
  · You get cold sores often. Where can you learn more? Go to http://www.gray.com/ Enter G379 in the search box to learn more about \"Cold Sores: Care Instructions. \" Current as of: February 26, 2020               Content Version: 12.8 © 1502-2947 [employer].   
Care instructions adapted under license by [employer] (which disclaims liability or warranty for this information). If you have questions about a medical condition or this instruction, always ask your healthcare professional. [address] any warranty or liability for your use of this information. Upper Respiratory Infection (Cold): Care Instructions Your Care Instructions An upper respiratory infection, or URI, is an infection of the nose, sinuses, or throat. URIs are spread by coughs, sneezes, and direct contact. The common cold is the most frequent kind of URI. The flu and sinus infections are other kinds of URIs. Almost all URIs are caused by viruses. Antibiotics won't cure them. But you can treat most infections with home care. This may include drinking lots of fluids and taking over-the-counter pain medicine. You will probably feel better in 4 to 10 days. The doctor has checked you carefully, but problems can develop later. If you notice any problems or new symptoms, get medical treatment right away. Follow-up care is a key part of your treatment and safety. Be sure to make and go to all appointments, and call your doctor if you are having problems. It's also a good idea to know your test results and keep a list of the medicines you take. How can you care for yourself at home? · To prevent dehydration, drink plenty of fluids. Choose water and other caffeine-free clear liquids until you feel better. If you have kidney, heart, or liver disease and have to limit fluids, talk with your doctor before you increase the amount of fluids you drink. · Take an over-the-counter pain medicine, such as acetaminophen (Tylenol), ibuprofen (Advil, Motrin), or naproxen (Aleve). Read and follow all instructions on the label. · Before you use cough and cold medicines, check the label. These medicines may not be safe for young children or for people with certain health problems.  
· Be careful when taking over-the-counter cold or flu medicines and Tylenol at the same time. Many of these medicines have acetaminophen, which is Tylenol. Read the labels to make sure that you are not taking more than the recommended dose. Too much acetaminophen (Tylenol) can be harmful. · Get plenty of rest. 
· Do not smoke or allow others to smoke around you. If you need help quitting, talk to your doctor about stop-smoking programs and medicines. These can increase your chances of quitting for good. When should you call for help? Call 911 anytime you think you may need emergency care. For example, call if: 
  · You have severe trouble breathing. Call your doctor now or seek immediate medical care if: 
  · You seem to be getting much sicker.  
  · You have new or worse trouble breathing.  
  · You have a new or higher fever.  
  · You have a new rash. Watch closely for changes in your health, and be sure to contact your doctor if: 
  · You have a new symptom, such as a sore throat, an earache, or sinus pain.  
  · You cough more deeply or more often, especially if you notice more mucus or a change in the color of your mucus.  
  · You do not get better as expected. Where can you learn more? Go to http://www.gray.com/ Enter C038 in the search box to learn more about \"Upper Respiratory Infection (Cold): Care Instructions. \" Current as of: October 26, 2020               Content Version: 12.8 © 5295-6849 [employer]. Care instructions adapted under license by [employer] (which disclaims liability or warranty for this information). If you have questions about a medical condition or this instruction, always ask your healthcare professional. [place] [address] any warranty or liability for your use of this information.

## 2021-06-10 NOTE — PROGRESS NOTES
Chief Complaint   Patient presents with    Ear Pain     1. Have you been to the ER, urgent care clinic since your last visit? Hospitalized since your last visit? No    2. Have you seen or consulted any other health care providers outside of the [address] [place] [address] since your last visit? Include any pap smears or colon screening. No  Fall Risk Assessment, last 12 mths 6/10/2021   Able to walk? Yes   Fall in past 12 months? 0   Do you feel unsteady? 0   Are you worried about falling 0     Abuse Screening Questionnaire 6/10/2021   Do you ever feel afraid of your partner? N   Are you in a relationship with someone who physically or mentally threatens you? N   Is it safe for you to go home?  Y       3 most recent PHQ Screens 6/10/2021   Little interest or pleasure in doing things Not at all   Feeling down, depressed, irritable, or hopeless Not at all   Total Score PHQ 2 0     Learning Assessment 6/10/2021   PRIMARY LEARNER Patient   HIGHEST LEVEL OF EDUCATION - PRIMARY LEARNER  GRADUATED HIGH SCHOOL OR GED   BARRIERS PRIMARY LEARNER NONE     -   CO-LEARNER CAREGIVER No   PRIMARY LANGUAGE ENGLISH   LEARNER PREFERENCE PRIMARY READING     -   ANSWERED BY Patient   RELATIONSHIP SELF

## 2021-07-09 ENCOUNTER — OFFICE VISIT (OUTPATIENT)
Dept: FAMILY MEDICINE CLINIC | Age: 36
End: 2021-07-09
Payer: COMMERCIAL

## 2021-07-09 VITALS
DIASTOLIC BLOOD PRESSURE: 76 MMHG | OXYGEN SATURATION: 97 % | BODY MASS INDEX: 29.16 KG/M2 | TEMPERATURE: 97.6 F | WEIGHT: 175 LBS | HEIGHT: 65 IN | RESPIRATION RATE: 20 BRPM | HEART RATE: 82 BPM | SYSTOLIC BLOOD PRESSURE: 138 MMHG

## 2021-07-09 DIAGNOSIS — E55.9 VITAMIN D DEFICIENCY: Primary | ICD-10-CM

## 2021-07-09 DIAGNOSIS — I10 ESSENTIAL HYPERTENSION: ICD-10-CM

## 2021-07-09 PROCEDURE — 36415 COLL VENOUS BLD VENIPUNCTURE: CPT | Performed by: NURSE PRACTITIONER

## 2021-07-09 PROCEDURE — 99214 OFFICE O/P EST MOD 30 MIN: CPT | Performed by: NURSE PRACTITIONER

## 2021-07-09 RX ORDER — LISINOPRIL 20 MG/1
20 TABLET ORAL DAILY
Qty: 30 TABLET | Refills: 2 | Status: SHIPPED | OUTPATIENT
Start: 2021-07-09 | End: 2021-08-16

## 2021-07-09 NOTE — PATIENT INSTRUCTIONS
High Blood Pressure: Care Instructions  Overview     It's normal for blood pressure to go up and down throughout the day. But if it stays up, you have high blood pressure. Another name for high blood pressure is hypertension. Despite what a lot of people think, high blood pressure usually doesn't cause headaches or make you feel dizzy or lightheaded. It usually has no symptoms. But it does increase your risk of stroke, heart attack, and other problems. You and your doctor will talk about your risks of these problems based on your blood pressure. Your doctor will give you a goal for your blood pressure. Your goal will be based on your health and your age. Lifestyle changes, such as eating healthy and being active, are always important to help lower blood pressure. You might also take medicine to reach your blood pressure goal.  Follow-up care is a key part of your treatment and safety. Be sure to make and go to all appointments, and call your doctor if you are having problems. It's also a good idea to know your test results and keep a list of the medicines you take. How can you care for yourself at home? Medical treatment  · If you stop taking your medicine, your blood pressure will go back up. You may take one or more types of medicine to lower your blood pressure. Be safe with medicines. Take your medicine exactly as prescribed. Call your doctor if you think you are having a problem with your medicine. · Talk to your doctor before you start taking aspirin every day. Aspirin can help certain people lower their risk of a heart attack or stroke. But taking aspirin isn't right for everyone, because it can cause serious bleeding. · See your doctor regularly. You may need to see the doctor more often at first or until your blood pressure comes down. · If you are taking blood pressure medicine, talk to your doctor before you take decongestants or anti-inflammatory medicine, such as ibuprofen.  Some of these medicines can raise blood pressure. · Learn how to check your blood pressure at home. Lifestyle changes  · Stay at a healthy weight. This is especially important if you put on weight around the waist. Losing even 10 pounds can help you lower your blood pressure. · If your doctor recommends it, get more exercise. Walking is a good choice. Bit by bit, increase the amount you walk every day. Try for at least 30 minutes on most days of the week. You also may want to swim, bike, or do other activities. · Avoid or limit alcohol. Talk to your doctor about whether you can drink any alcohol. · Try to limit how much sodium you eat to less than 2,300 milligrams (mg) a day. Your doctor may ask you to try to eat less than 1,500 mg a day. · Eat plenty of fruits (such as bananas and oranges), vegetables, legumes, whole grains, and low-fat dairy products. · Lower the amount of saturated fat in your diet. Saturated fat is found in animal products such as milk, cheese, and meat. Limiting these foods may help you lose weight and also lower your risk for heart disease. · Do not smoke. Smoking increases your risk for heart attack and stroke. If you need help quitting, talk to your doctor about stop-smoking programs and medicines. These can increase your chances of quitting for good. When should you call for help? Call  911 anytime you think you may need emergency care. This may mean having symptoms that suggest that your blood pressure is causing a serious heart or blood vessel problem. Your blood pressure may be over 180/120. For example, call 911 if:    · You have symptoms of a heart attack. These may include:  ? Chest pain or pressure, or a strange feeling in the chest.  ? Sweating. ? Shortness of breath. ? Nausea or vomiting. ? Pain, pressure, or a strange feeling in the back, neck, jaw, or upper belly or in one or both shoulders or arms. ? Lightheadedness or sudden weakness.   ? A fast or irregular heartbeat.     · You have symptoms of a stroke. These may include:  ? Sudden numbness, tingling, weakness, or loss of movement in your face, arm, or leg, especially on only one side of your body. ? Sudden vision changes. ? Sudden trouble speaking. ? Sudden confusion or trouble understanding simple statements. ? Sudden problems with walking or balance. ? A sudden, severe headache that is different from past headaches.     · You have severe back or belly pain. Do not wait until your blood pressure comes down on its own. Get help right away. Call your doctor now or seek immediate care if:    · Your blood pressure is much higher than normal (such as 180/120 or higher), but you don't have symptoms.     · You think high blood pressure is causing symptoms, such as:  ? Severe headache.  ? Blurry vision. Watch closely for changes in your health, and be sure to contact your doctor if:    · Your blood pressure measures higher than your doctor recommends at least 2 times. That means the top number is higher or the bottom number is higher, or both.     · You think you may be having side effects from your blood pressure medicine. Where can you learn more? Go to http://www.gray.com/  Enter [id number] in the search box to learn more about \"High Blood Pressure: Care Instructions. \"  Current as of: August 31, 2020               Content Version: 12.8  © 2006-2021 [employer]. Care instructions adapted under license by [employer] (which disclaims liability or warranty for this information). If you have questions about a medical condition or this instruction, always ask your healthcare professional. [place] [address] any warranty or liability for your use of this information.

## 2021-07-09 NOTE — PROGRESS NOTES
Chief Complaint   Patient presents with    Medication Evaluation       HPI:    Maria G aCrney is a 39 y.o. female in today for follow up. Allergic rhinitis: Stable, well controlled.      Chest pain: Improved. Recurrent BV: Patient has been treated multiple times a year for several years for BV, previously followed at [facility]. Using Metrogel as needed. IUD in place.     HTN: Now on lisinopril 10 mg. Home BP with systolic in the 224Q, diastolic in the 87C.    New issues: As above. Fasting for labs. Allergies   Allergen Reactions    Amoxicillin Unknown (comments)       Current Outpatient Medications   Medication Sig    lisinopriL (PRINIVIL, ZESTRIL) 20 mg tablet Take 1 Tablet by mouth daily.  metroNIDAZOLE (METROGEL) 0.75 % gel INSERT ON APPLICATORFUL INTO VAGINA NIGHTLY FOR 5 DAYS    valACYclovir (VALTREX) 1 gram tablet 2 tabs PO twice daily for 1 day PRN fever blisters    famotidine (PEPCID) 20 mg tablet Take 1 Tab by mouth two (2) times a day.  montelukast (SINGULAIR) 10 mg tablet Take 1 Tab by mouth daily.  Cetirizine (ZYRTEC) 10 mg cap Take  by mouth.  fluticasone propionate (FLONASE NA) by Nasal route.  levonorgestrel (MIRENA) 20 mcg/24 hr (5 years) IUD 1 Each by IntraUTERine route once. No current facility-administered medications for this visit. Past Medical History:   Diagnosis Date    Human papillomavirus in conditions classified elsewhere and of unspecified site 2006    Cervix    Menarche age 14    Migraine without aura, without mention of intractable migraine without mention of status migrainosus 2009       Family History   Problem Relation Age of Onset    Elevated Lipids Mother         elevated cholesterol    Hypertension Maternal Aunt        ROS:  Denies fever, chills, cough, chest pain, SOB,  nausea, vomiting, diarrhea, dysuria. Denies rashes, wounds, arthralgias, weakness, numbness, visual changes, depression.   Denies wt loss, wt gain, hemoptysis, hematochezia or melena. Patient is not experiencing chest pain radiating to the jaw and/or down the arms. Physical Examination:    /76 (BP 1 Location: Right arm, BP Patient Position: Sitting, BP Cuff Size: Adult)   Pulse 82   Temp 97.6 °F (36.4 °C) (Temporal)   Resp 20   Ht 5' 5\" (1.651 m)   Wt 175 lb (79.4 kg)   SpO2 97%   BMI 29.12 kg/m²     Wt Readings from Last 3 Encounters:   07/09/21 175 lb (79.4 kg)   05/28/21 174 lb (78.9 kg)   10/13/20 172 lb 6.4 oz (78.2 kg)       Constitutional: WDWN Female in no acute distress  HENT:  NC/AT, TMs pearly gray, OP: clear  EYES: EOMI, PERRL  Neck:  Supple, no JVD, mass or bruit. No thyromegaly. Respiratory:  Respirations even and unlabored without use of accessory muscles, CTA throughout without wheezes, rales, rubs or rhonchi. Symmetrical chest expansion. Cardiac: RRR  Musculoskeletal:  No cyanosis, clubbing or edema of extremities. Moves all extremities without difficulty. Neurologic:  Smooth, even gait without assistance, CN 2-12 grossly intact. Skin: intact and warm to the touch, no rash   Lymphadenopathy: no cervical or supraclavicular nodes  Psych: Pleasant and appropriate. Judgment normal. Alert and oriented x 3. ASSESSMENT AND PLAN:       ICD-10-CM ICD-9-CM    1. Vitamin D deficiency  E55.9 268.9 VITAMIN D, 25 HYDROXY      VITAMIN D, 25 HYDROXY   2. Essential hypertension  I10 401.9 lisinopriL (PRINIVIL, ZESTRIL) 20 mg tablet      COLLECTION VENOUS BLOOD,VENIPUNCTURE      METABOLIC PANEL, COMPREHENSIVE      CBC WITH AUTOMATED DIFF      TSH 3RD GENERATION      LIPID PANEL      LIPID PANEL      TSH 3RD GENERATION      CBC WITH AUTOMATED DIFF      METABOLIC PANEL, COMPREHENSIVE      COLLECTION VENOUS BLOOD,VENIPUNCTURE     Chronic labs today. Increase lisinopril to 20 mg. Send me a BP log via [employer] or come in for a BP check in 2 weeks to determine effectiveness. Patient aware of plan of care and verbalized understanding.  Questions answered. RTC pending labs, BP, or sooner if needed.     Dionisio Harrell NP

## 2021-07-11 LAB
25(OH)D3 SERPL-MCNC: 40.8 NG/ML (ref 30–100)
ALBUMIN SERPL-MCNC: 3.6 G/DL (ref 3.5–5)
ALBUMIN/GLOB SERPL: 1.2 {RATIO} (ref 1.1–2.2)
ALP SERPL-CCNC: 87 U/L (ref 45–117)
ALT SERPL-CCNC: 13 U/L (ref 12–78)
ANION GAP SERPL CALC-SCNC: 7 MMOL/L (ref 5–15)
AST SERPL-CCNC: 11 U/L (ref 15–37)
BASOPHILS # BLD: 0 K/UL (ref 0–0.1)
BASOPHILS NFR BLD: 1 % (ref 0–1)
BILIRUB SERPL-MCNC: 0.3 MG/DL (ref 0.2–1)
BUN SERPL-MCNC: 9 MG/DL (ref 6–20)
BUN/CREAT SERPL: 10 (ref 12–20)
CALCIUM SERPL-MCNC: 8.9 MG/DL (ref 8.5–10.1)
CHLORIDE SERPL-SCNC: 107 MMOL/L (ref 97–108)
CHOLEST SERPL-MCNC: 166 MG/DL
CO2 SERPL-SCNC: 24 MMOL/L (ref 21–32)
CREAT SERPL-MCNC: 0.9 MG/DL (ref 0.55–1.02)
DIFFERENTIAL METHOD BLD: NORMAL
EOSINOPHIL # BLD: 0.1 K/UL (ref 0–0.4)
EOSINOPHIL NFR BLD: 2 % (ref 0–7)
ERYTHROCYTE [DISTWIDTH] IN BLOOD BY AUTOMATED COUNT: 14 % (ref 11.5–14.5)
GLOBULIN SER CALC-MCNC: 3.1 G/DL (ref 2–4)
GLUCOSE SERPL-MCNC: 73 MG/DL (ref 65–100)
HCT VFR BLD AUTO: 40.3 % (ref 35–47)
HDLC SERPL-MCNC: 56 MG/DL
HDLC SERPL: 3 {RATIO} (ref 0–5)
HGB BLD-MCNC: 12.5 G/DL (ref 11.5–16)
IMM GRANULOCYTES # BLD AUTO: 0 K/UL (ref 0–0.04)
IMM GRANULOCYTES NFR BLD AUTO: 0 % (ref 0–0.5)
LDLC SERPL CALC-MCNC: 99.8 MG/DL (ref 0–100)
LYMPHOCYTES # BLD: 1.3 K/UL (ref 0.8–3.5)
LYMPHOCYTES NFR BLD: 24 % (ref 12–49)
MCH RBC QN AUTO: 28.7 PG (ref 26–34)
MCHC RBC AUTO-ENTMCNC: 31 G/DL (ref 30–36.5)
MCV RBC AUTO: 92.4 FL (ref 80–99)
MONOCYTES # BLD: 0.4 K/UL (ref 0–1)
MONOCYTES NFR BLD: 7 % (ref 5–13)
NEUTS SEG # BLD: 3.7 K/UL (ref 1.8–8)
NEUTS SEG NFR BLD: 67 % (ref 32–75)
NRBC # BLD: 0 K/UL (ref 0–0.01)
NRBC BLD-RTO: 0 PER 100 WBC
PLATELET # BLD AUTO: 210 K/UL (ref 150–400)
POTASSIUM SERPL-SCNC: 4.1 MMOL/L (ref 3.5–5.1)
PROT SERPL-MCNC: 6.7 G/DL (ref 6.4–8.2)
RBC # BLD AUTO: 4.36 M/UL (ref 3.8–5.2)
SODIUM SERPL-SCNC: 138 MMOL/L (ref 136–145)
TRIGL SERPL-MCNC: 51 MG/DL (ref ?–150)
TSH SERPL DL<=0.05 MIU/L-ACNC: 1.42 UIU/ML (ref 0.36–3.74)
VLDLC SERPL CALC-MCNC: 10.2 MG/DL
WBC # BLD AUTO: 5.6 K/UL (ref 3.6–11)

## 2021-08-16 ENCOUNTER — OFFICE VISIT (OUTPATIENT)
Dept: FAMILY MEDICINE CLINIC | Age: 36
End: 2021-08-16
Payer: COMMERCIAL

## 2021-08-16 VITALS
RESPIRATION RATE: 17 BRPM | OXYGEN SATURATION: 99 % | SYSTOLIC BLOOD PRESSURE: 140 MMHG | DIASTOLIC BLOOD PRESSURE: 92 MMHG | HEIGHT: 65 IN | BODY MASS INDEX: 28.32 KG/M2 | WEIGHT: 170 LBS | TEMPERATURE: 98.2 F | HEART RATE: 78 BPM

## 2021-08-16 DIAGNOSIS — I10 ESSENTIAL HYPERTENSION: Primary | ICD-10-CM

## 2021-08-16 DIAGNOSIS — B37.31 RECURRENT CANDIDIASIS OF VAGINA: ICD-10-CM

## 2021-08-16 PROCEDURE — 99214 OFFICE O/P EST MOD 30 MIN: CPT | Performed by: NURSE PRACTITIONER

## 2021-08-16 RX ORDER — FLUCONAZOLE 150 MG/1
150 TABLET ORAL DAILY
Qty: 1 TABLET | Refills: 0 | Status: SHIPPED | OUTPATIENT
Start: 2021-08-16 | End: 2021-08-17

## 2021-08-16 RX ORDER — AMLODIPINE BESYLATE 5 MG/1
5 TABLET ORAL DAILY
Qty: 90 TABLET | Refills: 4 | Status: SHIPPED | OUTPATIENT
Start: 2021-08-16 | End: 2022-08-23 | Stop reason: SDUPTHER

## 2021-08-16 NOTE — PROGRESS NOTES
Chief Complaint   Patient presents with    Hypertension     Bp f/u. Pt states bp still running high. HPI:      Cliff Yanez is a 39 y.o. female. She is a patient of Kalyn Glover. New Issues:  She was started on Lisinopril for HTN a few months ago. Dose boosted in July. BP still running high. She also is having symptoms of a possible yeast infection. This is a recurrent issue for her. Allergies   Allergen Reactions    Amoxicillin Unknown (comments)       Current Outpatient Medications   Medication Sig    amLODIPine (NORVASC) 5 mg tablet Take 1 Tablet by mouth daily. Indications: high blood pressure    fluconazole (DIFLUCAN) 150 mg tablet Take 1 Tablet by mouth daily for 1 day. FDA advises cautious prescribing of oral fluconazole in pregnancy.  famotidine (PEPCID) 20 mg tablet Take 1 tablet by mouth twice daily    montelukast (SINGULAIR) 10 mg tablet TAKE 1 TABLET BY MOUTH DAILY    metroNIDAZOLE (METROGEL) 0.75 % gel INSERT ON APPLICATORFUL INTO VAGINA NIGHTLY FOR 5 DAYS    valACYclovir (VALTREX) 1 gram tablet 2 tabs PO twice daily for 1 day PRN fever blisters    Cetirizine (ZYRTEC) 10 mg cap Take  by mouth.  fluticasone propionate (FLONASE NA) by Nasal route.  levonorgestrel (MIRENA) 20 mcg/24 hr (5 years) IUD 1 Each by IntraUTERine route once. No current facility-administered medications for this visit.        Past Medical History:   Diagnosis Date    Human papillomavirus in conditions classified elsewhere and of unspecified site 2006    Cervix    Menarche age 14    Migraine without aura, without mention of intractable migraine without mention of status migrainosus 2009       Past Surgical History:   Procedure Laterality Date    HX COLPOSCOPY  03/07/06    With Bx       Social History     Socioeconomic History    Marital status: SINGLE     Spouse name: Not on file    Number of children: Not on file    Years of education: Not on file    Highest education level: Not on file   Tobacco Use    Smoking status: Never Smoker    Smokeless tobacco: Never Used   Substance and Sexual Activity    Alcohol use: Yes    Drug use: No    Sexual activity: Yes     Birth control/protection: I.U.D. Social Determinants of Health     Financial Resource Strain:     Difficulty of Paying Living Expenses:    Food Insecurity:     Worried About Running Out of Food in the Last Year:     [address] in the Last Year:    Transportation Needs:     Lack of Transportation (Medical):  Lack of Transportation (Non-Medical):    Physical Activity:     Days of Exercise per Week:     Minutes of Exercise per Session:    Stress:     Feeling of Stress :    Social Connections:     Frequency of Communication with Friends and Family:     Frequency of Social Gatherings with Friends and Family:     Attends [employer] Services:     Active Member of Clubs or Organizations:     Attends Club or Organization Meetings:     Marital Status:        Family History   Problem Relation Age of Onset    Elevated Lipids Mother         elevated cholesterol    Hypertension Maternal Aunt        Above history reviewed. ROS:  Denies fever, chills, cough, chest pain, SOB,  nausea, vomiting, or diarrhea. Denies wt loss, wt gain, hemoptysis, hematochezia or melena. Physical Examination:    BP (!) 140/92 (BP 1 Location: Right arm, BP Patient Position: Sitting, BP Cuff Size: Adult long)   Pulse 78   Temp 98.2 °F (36.8 °C) (Temporal)   Resp 17   Ht 5' 5\" (1.651 m)   Wt 170 lb (77.1 kg)   SpO2 99%   BMI 28.29 kg/m²     General: Alert and Ox3, Fluent speech  HEENT:  PERRLA, EOM intact, TMs, turbinates, pharynx normal.  No thyromegaly. No cervical adenopathy.   Neck:  Supple, no adenopathy, JVD, mass or bruit  Chest:  Clear to Ausculation, without wheezes, rales, rubs or ronchi  Cardiac: RRR  Abdomen:  +BS, soft, nontender without palpable HSM  Extremities:  No cyanosis, clubbing or edema  Neurologic: Ambulatory without assist, CN 2-12 grossly intact. Moves all extremities. Skin: no rash  Lymphadenopathy: no cervical or supraclavicular nodes    ASSESSMENT AND PLAN:     1. Essential hypertension  Not controlled on Lisinopril  Switch to Amlodipine  Educated on potential side effects   - amLODIPine (NORVASC) 5 mg tablet; Take 1 Tablet by mouth daily. Indications: high blood pressure  Dispense: 90 Tablet; Refill: 4    2. Recurrent candidiasis of vagina  - fluconazole (DIFLUCAN) 150 mg tablet; Take 1 Tablet by mouth daily for 1 day. FDA advises cautious prescribing of oral fluconazole in pregnancy. Dispense: 1 Tablet;  Refill: 0      RTC in 3-4 weeks    Tima Santiago NP

## 2021-08-16 NOTE — PROGRESS NOTES
Chief Complaint   Patient presents with    Hypertension     Bp f/u. Pt states bp still running high. 3 most recent PHQ Screens 8/16/2021   Little interest or pleasure in doing things Not at all   Feeling down, depressed, irritable, or hopeless Not at all   Total Score PHQ 2 0     Learning Assessment 8/16/2021   PRIMARY LEARNER Patient   HIGHEST LEVEL OF EDUCATION - PRIMARY LEARNER  -   BARRIERS PRIMARY LEARNER -     -   CO-LEARNER CAREGIVER -   PRIMARY LANGUAGE ENGLISH   LEARNER PREFERENCE PRIMARY DEMONSTRATION     -   ANSWERED BY patient   RELATIONSHIP SELF     Fall Risk Assessment, last 12 mths 8/16/2021   Able to walk? Yes   Fall in past 12 months? 0   Do you feel unsteady? 0   Are you worried about falling 0     Abuse Screening Questionnaire 8/16/2021   Do you ever feel afraid of your partner? N   Are you in a relationship with someone who physically or mentally threatens you? N   Is it safe for you to go home? Y     ADL Assessment 8/16/2021   Feeding yourself No Help Needed   Getting from bed to chair No Help Needed   Getting dressed No Help Needed   Bathing or showering No Help Needed   Walk across the room (includes cane/walker) No Help Needed   Using the telphone No Help Needed   Taking your medications No Help Needed   Preparing meals No Help Needed   Managing money (expenses/bills) No Help Needed   Moderately strenuous housework (laundry) No Help Needed   Shopping for personal items (toiletries/medicines) No Help Needed   Shopping for groceries No Help Needed   Driving No Help Needed   Climbing a flight of stairs No Help Needed   Getting to places beyond walking distances No Help Needed     1. Have you been to the ER, urgent care clinic since your last visit? Hospitalized since your last visit? No    2. Have you seen or consulted any other health care providers outside of the [address] [place] [address] Chris since your last visit? Include any pap smears or colon screening.  No      Chief Complaint Patient presents with    Hypertension     Bp f/u. Pt states bp still running high. Visit Vitals  BP (!) 140/92 (BP 1 Location: Right arm, BP Patient Position: Sitting, BP Cuff Size: Adult long)   Pulse 78   Temp 98.2 °F (36.8 °C) (Temporal)   Resp 17   Ht 5' 5\" (1.651 m)   Wt 170 lb (77.1 kg)   SpO2 99%   BMI 28.29 kg/m²       Pain Scale: 0 - No pain/10  Pain Location:     Jaki Walters is a 39 y.o. female presenting for/with:    Hypertension (Bp f/u. Pt states bp still running high.)      Symptom review:    NO  Fever   NO  Shaking chills  NO  Cough  NO  Body aches  NO  Coughing up blood  NO  Chest congestion  NO  Chest pain  NO  Shortness of breath  NO  Profound Loss of smell/taste  NO  Nausea/Vomiting   NO  Loose stool/Diarrhea  NO  any skin issues    Patient Risk Factors Reviewed as follows:  NO  have you been in Close contact with confirmed COVID19 patient   NO  History of recent travel to affected geographical areas within the past 14 days  NO  COPD  NO  Active Cancer/Leukemia/Lymphoma/Chemotherapy  NO  Oral steroid use  NO  Pregnant  NO  Diabetes Mellitus  NO  Heart disease  NO  Asthma  NO Health care worker at home  NO Health care worker  NO Is there a Pregnant Woman in the home  NO Dialysis pt in the home   NO a large number of people living in the home  Recent Travel Screening and Travel History documentation     Travel Screening     Question   Response    In the last month, have you been in contact with someone who was confirmed or suspected to have Coronavirus / COVID-19? No / Unsure    Have you had a COVID-19 viral test in the last 14 days? No    Do you have any of the following new or worsening symptoms? None of these    Have you traveled internationally or domestically in the last month?   No      Travel History   Travel since 07/16/21     No documented travel since 07/16/21

## 2021-08-30 ENCOUNTER — OFFICE VISIT (OUTPATIENT)
Dept: FAMILY MEDICINE CLINIC | Age: 36
End: 2021-08-30
Payer: COMMERCIAL

## 2021-08-30 VITALS
RESPIRATION RATE: 16 BRPM | HEART RATE: 80 BPM | TEMPERATURE: 97.1 F | HEIGHT: 65 IN | WEIGHT: 164 LBS | SYSTOLIC BLOOD PRESSURE: 128 MMHG | DIASTOLIC BLOOD PRESSURE: 88 MMHG | BODY MASS INDEX: 27.32 KG/M2

## 2021-08-30 DIAGNOSIS — J30.9 CHRONIC ALLERGIC RHINITIS: ICD-10-CM

## 2021-08-30 DIAGNOSIS — I10 ESSENTIAL HYPERTENSION: Primary | ICD-10-CM

## 2021-08-30 PROCEDURE — 99213 OFFICE O/P EST LOW 20 MIN: CPT | Performed by: NURSE PRACTITIONER

## 2021-08-30 NOTE — PROGRESS NOTES
Chief Complaint   Patient presents with    Hypertension    Medication Evaluation     Amlodipine    Arm Pain     f/u concerns about covid vaccine side effects         HPI:      Maria G Carney is a 39 y.o. female. She is a patient of Santos Gonzalez. Her mother has a history of CHF. HTN: This has been a new issue over the past year. She was started on Lisinopril for HTN a few months ago. Dose boosted in July. She was still c/o elevated BP 2 weeks ago so she was switched to Amlodipine. No side effects. New Issues:  She is having a lot of issues with her allergies. Prescribed Zyrtec, Flonase and Singulair. Has never seen an allergist.     Allergies   Allergen Reactions    Amoxicillin Unknown (comments)       Current Outpatient Medications   Medication Sig    amLODIPine (NORVASC) 5 mg tablet Take 1 Tablet by mouth daily. Indications: high blood pressure    famotidine (PEPCID) 20 mg tablet Take 1 tablet by mouth twice daily    montelukast (SINGULAIR) 10 mg tablet TAKE 1 TABLET BY MOUTH DAILY    metroNIDAZOLE (METROGEL) 0.75 % gel INSERT ON APPLICATORFUL INTO VAGINA NIGHTLY FOR 5 DAYS    valACYclovir (VALTREX) 1 gram tablet 2 tabs PO twice daily for 1 day PRN fever blisters    Cetirizine (ZYRTEC) 10 mg cap Take  by mouth.  fluticasone propionate (FLONASE NA) by Nasal route.  levonorgestrel (MIRENA) 20 mcg/24 hr (5 years) IUD 1 Each by IntraUTERine route once. No current facility-administered medications for this visit.        Past Medical History:   Diagnosis Date    Human papillomavirus in conditions classified elsewhere and of unspecified site 2006    Cervix    Menarche age 14    Migraine without aura, without mention of intractable migraine without mention of status migrainosus 2009       Past Surgical History:   Procedure Laterality Date    HX COLPOSCOPY  03/07/06    With Bx       Social History     Socioeconomic History    Marital status: SINGLE     Spouse name: Not on file    Number of children: Not on file    Years of education: Not on file    Highest education level: Not on file   Tobacco Use    Smoking status: Never Smoker    Smokeless tobacco: Never Used   Substance and Sexual Activity    Alcohol use: Yes    Drug use: No    Sexual activity: Yes     Birth control/protection: I.U.D. Social Determinants of Health     Financial Resource Strain:     Difficulty of Paying Living Expenses:    Food Insecurity:     Worried About Running Out of Food in the Last Year:     [address] in the Last Year:    Transportation Needs:     Lack of Transportation (Medical):  Lack of Transportation (Non-Medical):    Physical Activity:     Days of Exercise per Week:     Minutes of Exercise per Session:    Stress:     Feeling of Stress :    Social Connections:     Frequency of Communication with Friends and Family:     Frequency of Social Gatherings with Friends and Family:     Attends [employer] Services:     Active Member of Clubs or Organizations:     Attends Club or Organization Meetings:     Marital Status:        Family History   Problem Relation Age of Onset    Elevated Lipids Mother         elevated cholesterol    Hypertension Maternal Aunt        Above history reviewed. ROS:  Denies fever, chills, cough, chest pain, SOB,  nausea, vomiting, or diarrhea. Denies wt loss, wt gain, hemoptysis, hematochezia or melena. Physical Examination:    /88 (BP 1 Location: Right arm, BP Patient Position: Sitting, BP Cuff Size: Adult long)   Pulse 80   Temp 97.1 °F (36.2 °C) (Temporal)   Resp 16   Ht 5' 5\" (1.651 m)   Wt 164 lb (74.4 kg)   BMI 27.29 kg/m²     General: Alert and Ox3, Fluent speech  HEENT:  PERRLA, EOM intact, TMs, turbinates, pharynx normal.  No thyromegaly. No cervical adenopathy.   Neck:  Supple, no adenopathy, JVD, mass or bruit  Chest:  Clear to Ausculation, without wheezes, rales, rubs or ronchi  Cardiac: RRR  Extremities:  No cyanosis, clubbing or edema  Neurologic:  Ambulatory without assist, CN 2-12 grossly intact. Moves all extremities. Skin: no rash  Lymphadenopathy: no cervical or supraclavicular nodes    ASSESSMENT AND PLAN:     1. Essential hypertension  Improved on Amlodipine  Recheck in 3 months     2.  Chronic allergic rhinitis  Consider switch to Xyzal  If not improved, consult allergist      RTC in 3 months    Leti Renteria, NP

## 2021-08-30 NOTE — PROGRESS NOTES
Chief Complaint   Patient presents with    Hypertension    Medication Evaluation     Amlodipine    Arm Pain     f/u concerns about covid vaccine side effects     3 most recent PHQ Screens 8/30/2021   Little interest or pleasure in doing things Not at all   Feeling down, depressed, irritable, or hopeless Not at all   Total Score PHQ 2 0     Learning Assessment 8/30/2021   PRIMARY LEARNER Patient   HIGHEST LEVEL OF EDUCATION - PRIMARY LEARNER  -   BARRIERS PRIMARY LEARNER -     -   CO-LEARNER CAREGIVER -   PRIMARY LANGUAGE ENGLISH   LEARNER PREFERENCE PRIMARY READING     -   ANSWERED BY patient   RELATIONSHIP SELF     Fall Risk Assessment, last 12 mths 8/30/2021   Able to walk? Yes   Fall in past 12 months? 0   Do you feel unsteady? 0   Are you worried about falling 0     Abuse Screening Questionnaire 8/30/2021   Do you ever feel afraid of your partner? N   Are you in a relationship with someone who physically or mentally threatens you? N   Is it safe for you to go home? Y     ADL Assessment 8/30/2021   Feeding yourself No Help Needed   Getting from bed to chair No Help Needed   Getting dressed No Help Needed   Bathing or showering No Help Needed   Walk across the room (includes cane/walker) No Help Needed   Using the telphone No Help Needed   Taking your medications No Help Needed   Preparing meals No Help Needed   Managing money (expenses/bills) No Help Needed   Moderately strenuous housework (laundry) No Help Needed   Shopping for personal items (toiletries/medicines) No Help Needed   Shopping for groceries No Help Needed   Driving No Help Needed   Climbing a flight of stairs No Help Needed   Getting to places beyond walking distances No Help Needed     1. Have you been to the ER, urgent care clinic since your last visit? Hospitalized since your last visit? No    2. Have you seen or consulted any other health care providers outside of the [address] [place] [address] Chris since your last visit?   Include any pap smears or colon screening. No      Chief Complaint   Patient presents with    Hypertension    Medication Evaluation     Amlodipine    Arm Pain     f/u concerns about covid vaccine side effects         Visit Vitals  /88 (BP 1 Location: Right arm, BP Patient Position: Sitting, BP Cuff Size: Adult long)   Pulse 80   Temp 97.1 °F (36.2 °C) (Temporal)   Resp 16   Ht 5' 5\" (1.651 m)   Wt 164 lb (74.4 kg)   BMI 27.29 kg/m²       Pain Scale: 5/10  Pain Location: Head (sinus pressure)    Jacky Paiz is a 39 y.o. female presenting for/with:    Hypertension, Medication Evaluation (Amlodipine), and Arm Pain (f/u concerns about covid vaccine side effects)      Symptom review:    NO  Fever   NO  Shaking chills  NO  Cough  NO  Body aches  NO  Coughing up blood  NO  Chest congestion  NO  Chest pain  NO  Shortness of breath  NO  Profound Loss of smell/taste  NO  Nausea/Vomiting   NO  Loose stool/Diarrhea  NO  any skin issues    Patient Risk Factors Reviewed as follows:  NO  have you been in Close contact with confirmed COVID19 patient   NO  History of recent travel to affected geographical areas within the past 14 days  NO  COPD  NO  Active Cancer/Leukemia/Lymphoma/Chemotherapy  NO  Oral steroid use  NO  Pregnant  NO  Diabetes Mellitus  NO  Heart disease  NO  Asthma  NO Health care worker at home  NO Health care worker  NO Is there a Pregnant Woman in the home  NO Dialysis pt in the home   NO a large number of people living in the home. Recent Travel Screening and Travel History documentation     Travel Screening     Question   Response    In the last month, have you been in contact with someone who was confirmed or suspected to have Coronavirus / COVID-19? No / Unsure    Have you had a COVID-19 viral test in the last 14 days? No    Do you have any of the following new or worsening symptoms? None of these    Have you traveled internationally or domestically in the last month?   No      Travel History   Travel since 07/30/21 No documented travel since 07/30/21

## 2021-11-15 ENCOUNTER — TELEPHONE (OUTPATIENT)
Dept: FAMILY MEDICINE CLINIC | Age: 36
End: 2021-11-15

## 2021-11-16 ENCOUNTER — OFFICE VISIT (OUTPATIENT)
Dept: FAMILY MEDICINE CLINIC | Age: 36
End: 2021-11-16
Payer: COMMERCIAL

## 2021-11-16 VITALS
HEIGHT: 65 IN | OXYGEN SATURATION: 99 % | HEART RATE: 84 BPM | WEIGHT: 168 LBS | TEMPERATURE: 97.6 F | SYSTOLIC BLOOD PRESSURE: 120 MMHG | DIASTOLIC BLOOD PRESSURE: 82 MMHG | BODY MASS INDEX: 27.99 KG/M2 | RESPIRATION RATE: 20 BRPM

## 2021-11-16 DIAGNOSIS — I10 ESSENTIAL HYPERTENSION: Primary | ICD-10-CM

## 2021-11-16 DIAGNOSIS — M79.10 MYALGIA: ICD-10-CM

## 2021-11-16 PROCEDURE — 99214 OFFICE O/P EST MOD 30 MIN: CPT | Performed by: NURSE PRACTITIONER

## 2021-11-16 NOTE — PROGRESS NOTES
Chief Complaint   Patient presents with    Blood Pressure Check       HPI:    Red Cochran is a 39 y.o. female in today for follow up. Allergic rhinitis: Stable, well controlled.      Chest pain: Intermittent, sharp stabbing pains, mid sternal, come and go. Has had for years but becoming more prevalent. Recurrent BV: Patient has been treated multiple times a year for several years for BV, previously followed at [facility]. Using Metrogel as needed. IUD in place.     HTN: Started on amlodipine 5 mg after she felt like lisinopril 20 mg wasn't helping her. Doing well.     New issues: Pt reports increased sharp, fleeting chest pains. Unrelated to activity. It feels like when she had GERD previously. She has been taking famotidine as needed. She was concerned about BP fluctuations as her medication was adjusted in August. Lastly, she reports R arm aching from the elbow to the wrist to the fingers that started after she received her COVID booster. Of note, the patient lost her mother last month. She has been grieving. Allergies   Allergen Reactions    Amoxicillin Unknown (comments)       Current Outpatient Medications   Medication Sig    amLODIPine (NORVASC) 5 mg tablet Take 1 Tablet by mouth daily. Indications: high blood pressure    famotidine (PEPCID) 20 mg tablet Take 1 tablet by mouth twice daily    montelukast (SINGULAIR) 10 mg tablet TAKE 1 TABLET BY MOUTH DAILY    metroNIDAZOLE (METROGEL) 0.75 % gel INSERT ON APPLICATORFUL INTO VAGINA NIGHTLY FOR 5 DAYS    valACYclovir (VALTREX) 1 gram tablet 2 tabs PO twice daily for 1 day PRN fever blisters    Cetirizine (ZYRTEC) 10 mg cap Take  by mouth.  fluticasone propionate (FLONASE NA) by Nasal route.  levonorgestrel (MIRENA) 20 mcg/24 hr (5 years) IUD 1 Each by IntraUTERine route once. No current facility-administered medications for this visit.        Past Medical History:   Diagnosis Date    Human papillomavirus in conditions classified elsewhere and of unspecified site 2006    Cervix    Menarche age 14    Migraine without aura, without mention of intractable migraine without mention of status migrainosus 2009       Family History   Problem Relation Age of Onset    Elevated Lipids Mother         elevated cholesterol    Hypertension Maternal Aunt        ROS:  Denies fever, chills, cough, chest pain, SOB,  nausea, vomiting, diarrhea, dysuria. Denies rashes, wounds, arthralgias, weakness, numbness, visual changes, depression. Denies wt loss, wt gain, hemoptysis, hematochezia or melena. Patient is not experiencing chest pain radiating to the jaw and/or down the arms. Physical Examination:    /82 (BP 1 Location: Right arm, BP Patient Position: Sitting, BP Cuff Size: Adult)   Pulse 84   Temp 97.6 °F (36.4 °C) (Temporal)   Resp 20   Ht 5' 5\" (1.651 m)   Wt 168 lb (76.2 kg)   SpO2 99%   BMI 27.96 kg/m²     Wt Readings from Last 3 Encounters:   11/16/21 168 lb (76.2 kg)   08/30/21 164 lb (74.4 kg)   08/16/21 170 lb (77.1 kg)       Constitutional: WDWN Female in no acute distress  HENT:  NC/AT, TMs pearly gray, OP: clear  EYES: EOMI, PERRL  Neck:  Supple, no JVD, mass or bruit. No thyromegaly. Respiratory:  Respirations even and unlabored without use of accessory muscles, CTA throughout without wheezes, rales, rubs or rhonchi. Symmetrical chest expansion. Cardiac: RRR  Musculoskeletal:  No cyanosis, clubbing or edema of extremities. Moves all extremities without difficulty. Neurologic:  Smooth, even gait without assistance, CN 2-12 grossly intact. Skin: intact and warm to the touch, no rash   Lymphadenopathy: no cervical or supraclavicular nodes  Psych: Pleasant and appropriate. Judgment normal. Alert and oriented x 3. ASSESSMENT AND PLAN:       ICD-10-CM ICD-9-CM    1. Essential hypertension  I10 401.9    2. Myalgia  M79.10 729.1         BP excellent, no changes. Restart famotidine. Take tylenol BID as directed. If no improvement, will try a course of steroids for possible cubital tunnel syndrome. Patient aware of plan of care and verbalized understanding. Questions answered. RTC 3 weeks, or sooner if needed.     Jerson Redman NP

## 2021-12-09 ENCOUNTER — OFFICE VISIT (OUTPATIENT)
Dept: FAMILY MEDICINE CLINIC | Age: 36
End: 2021-12-09
Payer: COMMERCIAL

## 2021-12-09 VITALS
HEIGHT: 65 IN | TEMPERATURE: 98 F | WEIGHT: 166.8 LBS | DIASTOLIC BLOOD PRESSURE: 80 MMHG | BODY MASS INDEX: 27.79 KG/M2 | HEART RATE: 73 BPM | RESPIRATION RATE: 18 BRPM | OXYGEN SATURATION: 96 % | SYSTOLIC BLOOD PRESSURE: 130 MMHG

## 2021-12-09 DIAGNOSIS — E55.9 VITAMIN D DEFICIENCY: ICD-10-CM

## 2021-12-09 DIAGNOSIS — I10 ESSENTIAL HYPERTENSION: Primary | ICD-10-CM

## 2021-12-09 DIAGNOSIS — Z23 ENCOUNTER FOR IMMUNIZATION: ICD-10-CM

## 2021-12-09 DIAGNOSIS — R53.83 FATIGUE, UNSPECIFIED TYPE: ICD-10-CM

## 2021-12-09 DIAGNOSIS — R74.8 ELEVATED LIVER ENZYMES: ICD-10-CM

## 2021-12-09 PROCEDURE — 90686 IIV4 VACC NO PRSV 0.5 ML IM: CPT | Performed by: NURSE PRACTITIONER

## 2021-12-09 PROCEDURE — 90471 IMMUNIZATION ADMIN: CPT | Performed by: NURSE PRACTITIONER

## 2021-12-09 PROCEDURE — 99214 OFFICE O/P EST MOD 30 MIN: CPT | Performed by: NURSE PRACTITIONER

## 2021-12-09 RX ORDER — HYDROXYZINE 25 MG/1
25 TABLET, FILM COATED ORAL
Qty: 20 TABLET | Refills: 0 | Status: SHIPPED | OUTPATIENT
Start: 2021-12-09 | End: 2021-12-19

## 2021-12-09 NOTE — PATIENT INSTRUCTIONS
Influenza (Flu) Vaccine: Care Instructions  Your Care Instructions     Influenza (flu) is an infection in the lungs and breathing passages. It is caused by the influenza virus. There are different strains, or types, of the flu virus every year. The flu comes on quickly. It can cause a cough, stuffy nose, fever, chills, tiredness, and aches and pains. These symptoms may last up to 10 days. The flu can make you feel very sick, but most of the time it doesn't lead to other problems. But it can cause serious problems in people who are older or who have a long-term illness, such as heart disease or diabetes. You can help prevent the flu by getting a flu vaccine every year, as soon as it is available. You cannot get the flu from the vaccine. The vaccine prevents most cases of the flu. But even when the vaccine doesn't prevent the flu, it can make symptoms less severe and reduce the chance of problems from the flu. Sometimes, young children and people who have an immune system problem may have a slight fever or muscle aches or pains 6 to 12 hours after getting the shot. These symptoms usually last 1 or 2 days. Follow-up care is a key part of your treatment and safety. Be sure to make and go to all appointments, and call your doctor if you are having problems. It's also a good idea to know your test results and keep a list of the medicines you take. Who should get the flu vaccine? Everyone age 7 months or older should get a flu vaccine each year. It lowers the chance of getting and spreading the flu. The vaccine is very important for people who are at high risk for getting other health problems from the flu. This includes:  · Anyone 48years of age or older. · People who live in a long-term care center, such as a nursing home. · All children 6 months through 25years of age. · Adults and children 6 months and older who have long-term heart or lung problems, such as asthma.   · Adults and children 6 months and older who needed medical care or were in a hospital during the past year because of diabetes, chronic kidney disease, or a weak immune system (including HIV or AIDS). · Women who will be pregnant during the flu season. · People who have any condition that can make it hard to breathe or swallow (such as a brain injury or muscle disorders). · People who can give the flu to others who are at high risk for problems from the flu. This includes all health care workers and close contacts of people age 72 or older. Who should not get the flu vaccine? The person who gives the vaccine may tell you not to get it if you:  · Have a severe allergy to eggs or any part of the vaccine. · Have had a severe reaction to a flu vaccine in the past.  · Have had Guillain-Barré syndrome (GBS). · Are sick with a fever. (Get the vaccine when symptoms are gone.)  How can you care for yourself at home? · If you or your child has a sore arm or a slight fever after the vaccine, take an over-the-counter pain medicine, such as acetaminophen (Tylenol) or ibuprofen (Advil, Motrin). Read and follow all instructions on the label. Do not give aspirin to anyone younger than 20. It has been linked to Reye syndrome, a serious illness. · Do not take two or more pain medicines at the same time unless the doctor told you to. Many pain medicines have acetaminophen, which is Tylenol. Too much acetaminophen (Tylenol) can be harmful. When should you call for help? Call 911 anytime you think you may need emergency care. For example, call if after getting the flu vaccine:    · You have symptoms of a severe reaction to the flu vaccine. Symptoms of a severe reaction may include:  ? Severe difficulty breathing. ? Sudden raised, red areas (hives) all over your body. ? Severe lightheadedness.    Call your doctor now or seek immediate medical care if after getting the flu vaccine:    · You think you are having a reaction to the flu vaccine, such as a new fever. Watch closely for changes in your health, and be sure to contact your doctor if you have any problems. Where can you learn more? Go to http://www.gray.com/  Enter N880 in the search box to learn more about \"Influenza (Flu) Vaccine: Care Instructions. \"  Current as of: August 31, 2020               Content Version: 13.0  © 0899-4547 [employer]. Care instructions adapted under license by [employer] (which disclaims liability or warranty for this information). If you have questions about a medical condition or this instruction, always ask your healthcare professional. [address] any warranty or liability for your use of this information.

## 2021-12-09 NOTE — PROGRESS NOTES
Chief Complaint   Patient presents with    Anxiety     Follow-up       HPI:    Elma Galvez is a 39 y.o. female in today for follow up. Allergic rhinitis: Stable, well controlled.      Chest pain: Intermittent, sharp stabbing pains, mid sternal, come and go. Recurrent BV: Patient has been treated multiple times a year for several years for BV, previously followed at [facility]. Using Metrogel as needed. IUD in place. Stable.     HTN: Now on amlodipine 5 mg.      New issues: Continues to struggle with grief, a sense of overwhelm since losing her mother. She has trouble falling asleep, lays in bed for 2 hours or so prior to falling asleep. She had R arm pain and aching that started after her COVID vaccination. She has been taking Tylenol twice a day over the past month and her symptoms are improving. Allergies   Allergen Reactions    Amoxicillin Unknown (comments)       Current Outpatient Medications   Medication Sig    hydrOXYzine HCL (ATARAX) 25 mg tablet Take 1 Tablet by mouth three (3) times daily as needed for Anxiety or Sleep for up to 10 days.  amLODIPine (NORVASC) 5 mg tablet Take 1 Tablet by mouth daily. Indications: high blood pressure    famotidine (PEPCID) 20 mg tablet Take 1 tablet by mouth twice daily    montelukast (SINGULAIR) 10 mg tablet TAKE 1 TABLET BY MOUTH DAILY    metroNIDAZOLE (METROGEL) 0.75 % gel INSERT ON APPLICATORFUL INTO VAGINA NIGHTLY FOR 5 DAYS    valACYclovir (VALTREX) 1 gram tablet 2 tabs PO twice daily for 1 day PRN fever blisters    Cetirizine (ZYRTEC) 10 mg cap Take  by mouth.  fluticasone propionate (FLONASE NA) by Nasal route.  levonorgestrel (MIRENA) 20 mcg/24 hr (5 years) IUD 1 Each by IntraUTERine route once. No current facility-administered medications for this visit.        Past Medical History:   Diagnosis Date    Human papillomavirus in conditions classified elsewhere and of unspecified site 2006    Cervix    Menarche age 15    Migraine without aura, without mention of intractable migraine without mention of status migrainosus 2009       Family History   Problem Relation Age of Onset    Elevated Lipids Mother         elevated cholesterol    Hypertension Maternal Aunt        ROS:  Denies fever, chills, cough, chest pain, SOB,  nausea, vomiting, diarrhea, dysuria. Denies rashes, wounds, + arthralgias, weakness, numbness, visual changes, depression. Denies wt loss, wt gain, hemoptysis, hematochezia or melena. Patient is not experiencing chest pain radiating to the jaw and/or down the arms. Physical Examination:    /80 (BP 1 Location: Right arm, BP Patient Position: Sitting, BP Cuff Size: Adult)   Pulse 73   Temp 98 °F (36.7 °C) (Temporal)   Resp 18   Ht 5' 5\" (1.651 m)   Wt 166 lb 12.8 oz (75.7 kg)   SpO2 96%   BMI 27.76 kg/m²     Wt Readings from Last 3 Encounters:   12/09/21 166 lb 12.8 oz (75.7 kg)   11/16/21 168 lb (76.2 kg)   08/30/21 164 lb (74.4 kg)       Constitutional: WDWN Female in no acute distress  HENT:  NC/AT, TMs pearly gray, OP: clear  EYES: EOMI, PERRL  Neck:  Supple, no JVD, mass or bruit. No thyromegaly. Respiratory:  Respirations even and unlabored without use of accessory muscles, CTA throughout without wheezes, rales, rubs or rhonchi. Symmetrical chest expansion. Cardiac: RRR  Musculoskeletal:  No cyanosis, clubbing or edema of extremities. Moves all extremities without difficulty. Neurologic:  Smooth, even gait without assistance, CN 2-12 grossly intact. Skin: intact and warm to the touch, no rash   Lymphadenopathy: no cervical or supraclavicular nodes  Psych: Pleasant and appropriate. Judgment normal. Alert and oriented x 3. ASSESSMENT AND PLAN:       ICD-10-CM ICD-9-CM    1. Elevated liver enzymes  R74.8 790.5    2. Essential hypertension  I10 401.9 COLLECTION VENOUS BLOOD,VENIPUNCTURE      METABOLIC PANEL, COMPREHENSIVE      CBC WITH AUTOMATED DIFF   3.  Encounter for immunization  Z23 V03.89 MA IMMUNIZ ADMIN,1 SINGLE/COMB VAC/TOXOID      INFLUENZA VIRUS VAC QUAD,SPLIT,PRESV FREE SYRINGE IM   4. Fatigue, unspecified type  R53.83 780.79 COLLECTION VENOUS BLOOD,VENIPUNCTURE      VITAMIN B12   5. Vitamin D deficiency  E55.9 268.9 COLLECTION VENOUS BLOOD,VENIPUNCTURE      VITAMIN D, 25 HYDROXY      BP remains at goal. Check labs as above for fatigue, check LFTs due to regular Tylenol use. Rx Atarax to use PRN for insomnia and stress. May cause sedation, use with caution. Flu vaccine today. Patient aware of plan of care and verbalized understanding. Questions answered. RTC pending labs, or sooner if needed.     Neli Solorzano NP

## 2021-12-10 LAB
25(OH)D3 SERPL-MCNC: 25.1 NG/ML (ref 30–100)
ALBUMIN SERPL-MCNC: 4 G/DL (ref 3.5–5)
ALBUMIN/GLOB SERPL: 1.3 {RATIO} (ref 1.1–2.2)
ALP SERPL-CCNC: 85 U/L (ref 45–117)
ALT SERPL-CCNC: 15 U/L (ref 12–78)
ANION GAP SERPL CALC-SCNC: 4 MMOL/L (ref 5–15)
AST SERPL-CCNC: 5 U/L (ref 15–37)
BASOPHILS # BLD: 0.1 K/UL (ref 0–0.1)
BASOPHILS NFR BLD: 1 % (ref 0–1)
BILIRUB SERPL-MCNC: 0.4 MG/DL (ref 0.2–1)
BUN SERPL-MCNC: 6 MG/DL (ref 6–20)
BUN/CREAT SERPL: 7 (ref 12–20)
CALCIUM SERPL-MCNC: 9.4 MG/DL (ref 8.5–10.1)
CHLORIDE SERPL-SCNC: 108 MMOL/L (ref 97–108)
CO2 SERPL-SCNC: 27 MMOL/L (ref 21–32)
CREAT SERPL-MCNC: 0.83 MG/DL (ref 0.55–1.02)
DIFFERENTIAL METHOD BLD: NORMAL
EOSINOPHIL # BLD: 0.1 K/UL (ref 0–0.4)
EOSINOPHIL NFR BLD: 1 % (ref 0–7)
ERYTHROCYTE [DISTWIDTH] IN BLOOD BY AUTOMATED COUNT: 13.4 % (ref 11.5–14.5)
GLOBULIN SER CALC-MCNC: 3.1 G/DL (ref 2–4)
GLUCOSE SERPL-MCNC: 93 MG/DL (ref 65–100)
HCT VFR BLD AUTO: 39.9 % (ref 35–47)
HGB BLD-MCNC: 12.7 G/DL (ref 11.5–16)
IMM GRANULOCYTES # BLD AUTO: 0 K/UL (ref 0–0.04)
IMM GRANULOCYTES NFR BLD AUTO: 0 % (ref 0–0.5)
LYMPHOCYTES # BLD: 1.3 K/UL (ref 0.8–3.5)
LYMPHOCYTES NFR BLD: 24 % (ref 12–49)
MCH RBC QN AUTO: 28.7 PG (ref 26–34)
MCHC RBC AUTO-ENTMCNC: 31.8 G/DL (ref 30–36.5)
MCV RBC AUTO: 90.1 FL (ref 80–99)
MONOCYTES # BLD: 0.4 K/UL (ref 0–1)
MONOCYTES NFR BLD: 8 % (ref 5–13)
NEUTS SEG # BLD: 3.6 K/UL (ref 1.8–8)
NEUTS SEG NFR BLD: 66 % (ref 32–75)
NRBC # BLD: 0 K/UL (ref 0–0.01)
NRBC BLD-RTO: 0 PER 100 WBC
PLATELET # BLD AUTO: 244 K/UL (ref 150–400)
PMV BLD AUTO: 12.6 FL (ref 8.9–12.9)
POTASSIUM SERPL-SCNC: 3.9 MMOL/L (ref 3.5–5.1)
PROT SERPL-MCNC: 7.1 G/DL (ref 6.4–8.2)
RBC # BLD AUTO: 4.43 M/UL (ref 3.8–5.2)
SODIUM SERPL-SCNC: 139 MMOL/L (ref 136–145)
VIT B12 SERPL-MCNC: 382 PG/ML (ref 193–986)
WBC # BLD AUTO: 5.5 K/UL (ref 3.6–11)

## 2021-12-10 NOTE — PROGRESS NOTES
Please call. Vitamin D slightly low. Is she still taking her 5000 unit supplement? She should be. All other labs look great!

## 2021-12-10 NOTE — PROGRESS NOTES
Patient verified by stating name and date of birth.  Patient informed of lab results and states understanding per Susy Cordon

## 2021-12-22 ENCOUNTER — NURSE TRIAGE (OUTPATIENT)
Dept: OTHER | Facility: CLINIC | Age: 36
End: 2021-12-22

## 2021-12-22 ENCOUNTER — TELEPHONE (OUTPATIENT)
Dept: FAMILY MEDICINE CLINIC | Age: 36
End: 2021-12-22

## 2021-12-22 NOTE — TELEPHONE ENCOUNTER
----- Message from Codie Saavedra sent at 12/22/2021  9:03 AM EST -----  Subject: Message to Provider    QUESTIONS  Information for Provider? PT HAS COLD AND BODY ACHES,AND NEEDS TO SEE DR   ASAP NO AVAILABLE   ---------------------------------------------------------------------------  --------------  CALL BACK INFO  What is the best way for the office to contact you? OK to leave message on   voicemail  Preferred Call Back Phone Number? [phone]  ---------------------------------------------------------------------------  --------------  SCRIPT ANSWERS  Relationship to Patient? Self  Are you currently unable to finish sentences due to any difficulty   breathing? No  Are you unable to swallow liquids? No  Are you having fevers (100.4 or greater), chills, or sweats? No  Do you have COPD, asthma or a chronic lung condition? No  Have your symptoms been present for more than 5 days? No  Have you recently (14 days) been seen by a provider for this issue?  Yes

## 2021-12-22 NOTE — TELEPHONE ENCOUNTER
Received call from Venice Bernheim at [facility] with Red Flag Complaint. Subjective: Caller states \"HA and body aches x 2 days\"     Current Symptoms: Headache that comes and goes. Body aches x 2 days. A little congested. States it is the worse headache of her life. Onset: 2 days ago; gradual, intermittent    Associated Symptoms: NA    Pain Severity: 9/10; throbbing; intermittent    Temperature: Denies fever     What has been tried: Mucinex and theraflu started taking yesterday    LMP: Mirena Pregnant: No    Recommended disposition: 2nd level triage. Pt disconnected while giving info to office's staff. Gave staff pt's contact info and complaint. Advised pt if they are not able to get her seen today to go to ED. Care advice provided, patient verbalizes understanding; denies any other questions or concerns; instructed to call back for any new or worsening symptoms. Gave office's staff pt's contact info, pt had disconnected. Attention Provider: Thank you for allowing me to participate in the care of your patient. The patient was connected to triage in response to information provided to the ECC. Please do not respond through this encounter as the response is not directed to a shared pool.     Reason for Disposition   SEVERE headache, states 'worst headache' of life    Protocols used: HEADACHE-ADULT-[place]

## 2022-01-27 ENCOUNTER — TELEPHONE (OUTPATIENT)
Dept: FAMILY MEDICINE CLINIC | Age: 37
End: 2022-01-27

## 2022-01-28 RX ORDER — FLUCONAZOLE 150 MG/1
150 TABLET ORAL DAILY
Qty: 1 TABLET | Refills: 0 | Status: SHIPPED | OUTPATIENT
Start: 2022-01-28 | End: 2022-01-29

## 2022-03-07 ENCOUNTER — VIRTUAL VISIT (OUTPATIENT)
Dept: FAMILY MEDICINE CLINIC | Age: 37
End: 2022-03-07
Payer: COMMERCIAL

## 2022-03-07 DIAGNOSIS — J30.1 ALLERGIC RHINITIS DUE TO POLLEN, UNSPECIFIED SEASONALITY: Primary | ICD-10-CM

## 2022-03-07 PROCEDURE — 99213 OFFICE O/P EST LOW 20 MIN: CPT | Performed by: NURSE PRACTITIONER

## 2022-03-07 RX ORDER — DEXAMETHASONE SODIUM PHOSPHATE 10 MG/ML
4 INJECTION INTRAMUSCULAR; INTRAVENOUS ONCE
Qty: 1 ML | Refills: 0
Start: 2022-03-07 | End: 2022-03-07

## 2022-03-07 RX ORDER — METHYLPREDNISOLONE ACETATE 40 MG/ML
40 INJECTION, SUSPENSION INTRA-ARTICULAR; INTRALESIONAL; INTRAMUSCULAR; SOFT TISSUE ONCE
Qty: 1 ML | Refills: 0
Start: 2022-03-07 | End: 2022-03-07

## 2022-03-07 NOTE — PATIENT INSTRUCTIONS
Allergies: Care Instructions  Overview     Allergies occur when your body's defense system (immune system) overreacts to certain substances. The immune system treats a harmless substance as if it were a harmful germ or virus. Many things can make this happen. These include pollens, medicine, food, dust, animal dander, and mold. Allergies can be mild or severe. Mild allergies can be managed with home treatment. But medicine may be needed to prevent problems. Managing your allergies is an important part of staying healthy. Your doctor may suggest that you have allergy testing to help find out what is causing your allergies. Severe allergies can cause reactions that affect your whole body (anaphylactic reactions). Your doctor may prescribe a shot of epinephrine to carry with you in case you have a severe reaction. Learn how to give yourself the shot and keep it with you at all times. Make sure it is not . Follow-up care is a key part of your treatment and safety. Be sure to make and go to all appointments, and call your doctor if you are having problems. It's also a good idea to know your test results and keep a list of the medicines you take. How can you care for yourself at home? · If you have been told by your doctor that dust or dust mites are causing your allergy, decrease the dust around your bed:  ? Wash sheets, pillowcases, and other bedding in hot water every week. ? Use dust-proof covers for pillows, duvets, and mattresses. Avoid plastic covers because they tear easily and do not \"breathe. \" Wash as instructed on the label. ? Do not use any blankets and pillows that you do not need. ? Use blankets that you can wash in your washing machine. ? Consider removing drapes and carpets, which attract and hold dust, from your bedroom. · If you are allergic to house dust and mites, do not use home humidifiers. Your doctor can suggest ways you can control dust and mites.   · Look for signs of cockroaches. Cockroaches cause allergic reactions. Use cockroach baits to get rid of them. Then, clean your home well. Cockroaches like areas where grocery bags, newspapers, empty bottles, or cardboard boxes are stored. Do not keep these inside your home, and keep trash and food containers sealed. Seal off any spots where cockroaches might enter your home. · If you are allergic to mold, get rid of furniture, rugs, and drapes that smell musty. Check for mold in the bathroom. · If you are allergic to outdoor pollen or mold spores, use air-conditioning. Change or clean all filters every month. Keep windows closed. · If you are allergic to pollen, stay inside when pollen counts are high. Use a vacuum  with a HEPA filter or a double-thickness filter at least two times each week. · Stay inside when air pollution is bad. Avoid paint fumes, perfumes, and other strong odors. · Avoid conditions that make your allergies worse. Stay away from smoke. Do not smoke or let anyone else smoke in your house. Do not use fireplaces or wood-burning stoves. · If you are allergic to your pets, change the air filter in your furnace every month. Use high-efficiency filters. · If you are allergic to pet dander, keep pets outside or out of your bedroom. Old carpet and cloth furniture can hold a lot of animal dander. You may need to replace them. When should you call for help? Give an epinephrine shot if:    · You think you are having a severe allergic reaction.     · You have symptoms in more than one body area, such as mild nausea and an itchy mouth. After giving an epinephrine shot call 911, even if you feel better. Call 911 if:    · You have symptoms of a severe allergic reaction. These may include:  ? Sudden raised, red areas (hives) all over your body. ? Swelling of the throat, mouth, lips, or tongue. ? Trouble breathing. ? Passing out (losing consciousness).  Or you may feel very lightheaded or suddenly feel weak, confused, or restless.     · You have been given an epinephrine shot, even if you feel better. Call your doctor now or seek immediate medical care if:    · You have symptoms of an allergic reaction, such as:  ? A rash or hives (raised, red areas on the skin). ? Itching. ? Swelling. ? Belly pain, nausea, or vomiting. Watch closely for changes in your health, and be sure to contact your doctor if:    · You do not get better as expected. Where can you learn more? Go to http://www.hendrix.com/  Enter W171 in the search box to learn more about \"Allergies: Care Instructions. \"  Current as of: February 10, 2021               Content Version: 13.0  © 2006-2021 [employer]. Care instructions adapted under license by [employer] (which disclaims liability or warranty for this information). If you have questions about a medical condition or this instruction, always ask your healthcare professional. [place] [address] any warranty or liability for your use of this information.

## 2022-03-07 NOTE — PROGRESS NOTES
Chief Complaint   Patient presents with    Allergies     1. Have you been to the ER, urgent care clinic since your last visit? Hospitalized since your last visit? No    2. Have you seen or consulted any other health care providers outside of the [address] [place] [address] since your last visit? Include any pap smears or colon screening. No    Fall Risk Assessment, last 12 mths 3/7/2022   Able to walk? Yes   Fall in past 12 months? 0   Do you feel unsteady? 0   Are you worried about falling 0     Abuse Screening Questionnaire 3/7/2022   Do you ever feel afraid of your partner? N   Are you in a relationship with someone who physically or mentally threatens you? N   Is it safe for you to go home?  Y     3 most recent PHQ Screens 12/9/2021   Little interest or pleasure in doing things Not at all   Feeling down, depressed, irritable, or hopeless Not at all   Total Score PHQ 2 0

## 2022-03-07 NOTE — PROGRESS NOTES
Homer Lee is a 44 y.o. female who was seen by synchronous (real-time) audio-video technology on 3/7/2022. This encounter was conducted via [employer]. Consent:  Homer Lee, who was evaluated through a synchronous (real-time) audio-video encounter, and/or her healthcare decision maker, is aware that it is a billable service, which includes applicable co-pays, with coverage as determined by her insurance carrier. She provided verbal consent to proceed and patient identification was verified. This visit was conducted pursuant to the emergency declaration under the [facility], [address] [place] [address] authority and the [employer] Act. A caregiver was present when appropriate. Ability to conduct physical exam was limited. The patient was located at home in a state where the provider was licensed to provide care. --Jona Boucher NP on 3/7/2022 at 12:43 PM    I was in the office while conducting this encounter. 712  Subjective:   HPI: Homer Lee was seen for Allergies    The patient was seen virtually today with a CC of allergy symptoms for the past week. She notes sinus congestion, PND. Afebrile. She is using Singulair, Zyrtec, and Mucinex with little relief. Requesting a steroid injection which has helped her significantly in the past.       Allergies   Allergen Reactions    Amoxicillin Unknown (comments)       Current Outpatient Medications   Medication Sig    methylPREDNISolone acetate (DEPO-MEDROL) 40 mg/mL injection 1 mL by IntraMUSCular route once for 1 dose.  dexamethasone, PF, (DECADRON) 10 mg/mL injection 0.4 mL by IntraMUSCular route once for 1 dose.  amLODIPine (NORVASC) 5 mg tablet Take 1 Tablet by mouth daily.  Indications: high blood pressure    famotidine (PEPCID) 20 mg tablet Take 1 tablet by mouth twice daily    montelukast (SINGULAIR) 10 mg tablet TAKE 1 TABLET BY MOUTH DAILY    metroNIDAZOLE (METROGEL) 0.75 % gel INSERT ON APPLICATORFUL INTO VAGINA NIGHTLY FOR 5 DAYS    valACYclovir (VALTREX) 1 gram tablet 2 tabs PO twice daily for 1 day PRN fever blisters    Cetirizine (ZYRTEC) 10 mg cap Take  by mouth.  fluticasone propionate (FLONASE NA) by Nasal route.  levonorgestrel (MIRENA) 20 mcg/24 hr (5 years) IUD 1 Each by IntraUTERine route once. No current facility-administered medications for this visit. Past Medical History:   Diagnosis Date    Human papillomavirus in conditions classified elsewhere and of unspecified site 2006    Cervix    Menarche age 14    Migraine without aura, without mention of intractable migraine without mention of status migrainosus 2009       Family History   Problem Relation Age of Onset    Elevated Lipids Mother         elevated cholesterol    Hypertension Maternal Aunt        ROS:  Denies fever, chills, + cough, chest pain, SOB,  nausea, vomiting, diarrhea, dysuria. Denies rashes, wounds, arthralgias, weakness, numbness, visual changes, depression. Denies wt loss, wt gain, hemoptysis, hematochezia or melena. Patient is not experiencing chest pain radiating to the jaw and/or down the arms. PHYSICAL EXAMINATION:    Vital Signs: (As obtained by patient/caregiver at home)  There were no vitals taken for this visit.      Constitutional: [x] Appears well-developed and well-nourished [x] No apparent distress      [] Abnormal -     Mental status: [x] Alert and awake  [x] Oriented to person/place/time [x] Able to follow commands    [] Abnormal -     Eyes:   EOM    [x]  Normal    [] Abnormal -   Sclera  [x]  Normal    [] Abnormal -          Discharge [x]  None visible   [] Abnormal -     HENT: [x] Normocephalic, atraumatic  [] Abnormal -   [x] Mouth/Throat: Mucous membranes are moist    External Ears [x] Normal  [] Abnormal -    Neck: [x] No visualized mass [] Abnormal -     Pulmonary/Chest: [x] Respiratory effort normal   [x] No visualized signs of difficulty breathing or respiratory distress        [] Abnormal -      Musculoskeletal:   [x] Normal gait with no signs of ataxia         [x] Normal range of motion of neck        [] Abnormal -     Neurological:        [x] No Facial Asymmetry (Cranial nerve 7 motor function) (limited exam due to video visit)          [x] No gaze palsy        [] Abnormal -          Skin:        [x] No significant exanthematous lesions or discoloration noted on facial skin         [] Abnormal -            Psychiatric:       [x] Normal Affect [] Abnormal -        [x] No Hallucinations    Other pertinent observable physical exam findings:-        Assessment & Plan:       ICD-10-CM ICD-9-CM    1. Allergic rhinitis due to pollen, unspecified seasonality  J30.1 477.0 METHYLPREDNISOLONE ACETATE INJECTION 40 MG      TX THER/PROPH/DIAG INJECTION, SUBCUT/IM      methylPREDNISolone acetate (DEPO-MEDROL) 40 mg/mL injection      dexamethasone, PF, (DECADRON) 10 mg/mL injection      DEXAMETHASONE SODIUM PHOSPHATE INJECTION 1 MG     Ok to come by today for an injection. Continue antihistamine, intranasal steroid and Singulair. Avoid triggers. Patient aware of plan of care and verbalized understanding. Questions answered. RTC PRN. We discussed the expected course, resolution and complications of the diagnosis(es) in detail. Medication risks, benefits, costs, interactions, and alternatives were discussed as indicated. I advised her to contact the office if her condition worsens, changes or fails to improve as anticipated. She expressed understanding with the diagnosis(es) and plan.      Pursuant to the emergency declaration under the [facility], [facility] waiver authority and the [employer] Act, this Virtual  Visit was conducted, with patient's consent, to reduce the patient's risk of exposure to COVID-19 and provide continuity of care for an established patient. Services were provided through a video synchronous discussion virtually to substitute for in-person clinic visit.     Cassie Claire NP

## 2022-03-07 NOTE — PROGRESS NOTES
Patient presented to the office for her 2 injections combined in  Left arm sitting, one per rSi Child, left glut IM, see admin.  80, HR 73, O2 97%. RR 20.

## 2022-03-19 PROBLEM — R74.8 ELEVATED LIVER ENZYMES: Status: ACTIVE | Noted: 2018-11-28

## 2022-03-19 PROBLEM — I10 ESSENTIAL HYPERTENSION: Status: ACTIVE | Noted: 2021-08-30

## 2022-03-20 PROBLEM — J30.9 CHRONIC ALLERGIC RHINITIS: Status: ACTIVE | Noted: 2021-08-30

## 2022-04-18 ENCOUNTER — OFFICE VISIT (OUTPATIENT)
Dept: FAMILY MEDICINE CLINIC | Age: 37
End: 2022-04-18
Payer: COMMERCIAL

## 2022-04-18 VITALS
TEMPERATURE: 97.3 F | WEIGHT: 170.4 LBS | RESPIRATION RATE: 16 BRPM | DIASTOLIC BLOOD PRESSURE: 78 MMHG | BODY MASS INDEX: 27.38 KG/M2 | SYSTOLIC BLOOD PRESSURE: 114 MMHG | HEIGHT: 66 IN

## 2022-04-18 DIAGNOSIS — H69.92 DISORDER OF LEFT EUSTACHIAN TUBE: Primary | ICD-10-CM

## 2022-04-18 DIAGNOSIS — N89.8 VAGINAL ITCHING: ICD-10-CM

## 2022-04-18 PROCEDURE — 99214 OFFICE O/P EST MOD 30 MIN: CPT | Performed by: NURSE PRACTITIONER

## 2022-04-18 RX ORDER — PREDNISONE 10 MG/1
TABLET ORAL
Qty: 21 TABLET | Refills: 0 | Status: SHIPPED | OUTPATIENT
Start: 2022-04-18 | End: 2022-05-09 | Stop reason: ALTCHOICE

## 2022-04-18 NOTE — PATIENT INSTRUCTIONS
Eustachian Tube Problems: Care Instructions  Overview     The eustachian (say \"you-STAY-shee-un\") tubes run between the inside of the ears and the throat. They keep air pressure stable in the ears. If your eustachian tubes become blocked, the air pressure in your ears changes. The fluids from a cold can clog eustachian tubes, causing pain in the ears. A quick change in air pressure can cause eustachian tubes to close up. This might happen when an airplane changes altitude or when a  goes up or down underwater. Eustachian tube problems often clear up on their own or after treating the cause of the blockage. If your tubes continue to be blocked, you may need surgery. Follow-up care is a key part of your treatment and safety. Be sure to make and go to all appointments, and call your doctor if you are having problems. It's also a good idea to know your test results and keep a list of the medicines you take. How can you care for yourself at home? · Try a simple exercise to help open blocked tubes. Close your mouth, hold your nose, and gently blow as if you are blowing your nose. Yawning and chewing gum also may help. You may hear or feel a \"pop\" when the tubes open. · To ease ear pain, apply a warm washcloth or a heating pad set on low. There may be some drainage from the ear when the heat melts earwax. Put a cloth between the heat source and your skin. · If your doctor prescribed antibiotics, take them as directed. Do not stop taking them just because you feel better. You need to take the full course of antibiotics. · Be safe with medicines. Depending on the cause of the problem, your doctor may recommend over-the-counter medicine. For example, adults may try decongestants for cold symptoms or nasal spray steroids for allergies. Follow the instructions carefully. · Be careful with cough and cold medicines.  Don't give them to children younger than 6, because they don't work for children that age and can even be harmful. For children 6 and older, always follow all the instructions carefully. Make sure you know how much medicine to give and how long to use it. And use the dosing device if one is included. When should you call for help? Call your doctor now or seek immediate medical care if:    · You develop sudden, complete hearing loss.     · You have severe pain or feel dizzy.     · You have new or increasing pus or blood draining from your ear.     · You have redness, swelling, or pain around or behind the ear. Watch closely for changes in your health, and be sure to contact your doctor if:    · You do not get better after 2 weeks.     · You have any new symptoms, such as itching or a feeling of fullness in the ear. Where can you learn more? Go to http://www.gray.com/  Enter Y822 in the search box to learn more about \"Eustachian Tube Problems: Care Instructions. \"  Current as of: September 8, 2021               Content Version: 13.2  © 2006-2022 [employer]. Care instructions adapted under license by [employer] (which disclaims liability or warranty for this information). If you have questions about a medical condition or this instruction, always ask your healthcare professional. [address] any warranty or liability for your use of this information. Switch Zyrtec to Zyrtec-D just for the next three days and then go back to your usual Zyrtec. I am also sending steroids, start these if you don't get relief with the Zyrtec D. If you finish the steroids and you don't have any relief, let me know.

## 2022-04-18 NOTE — PROGRESS NOTES
1. \"Have you been to the ER, urgent care clinic since your last visit? Hospitalized since your last visit? \" No    2. \"Have you seen or consulted any other health care providers outside of the [address] [place] [address] since your last visit? \" No     3. For patients aged 39-70: Has the patient had a colonoscopy / FIT/ Cologuard? NA - based on age      If the patient is female:    4. For patients aged 41-77: Has the patient had a mammogram within the past 2 years? NA - based on age or sex      11. For patients aged 21-65: Has the patient had a pap smear?  Yes - no Care Gap present

## 2022-04-18 NOTE — PROGRESS NOTES
Chief Complaint   Patient presents with    Ear Pain     left ear, throbbing pain - started thursday/friday     Vaginal Itching       HPI:    King Nohemy is a 44 y.o. female in today for an acute visit with a CC of L sided ear pain that comes and goes over the weekend. She tried HA medication without relief. No fever. No recent travel. + sinus congestion, drainage. She also reports chronic, intermittent vaginal itching. She has tried metronidazole, changing soaps without improvement. She last saw GYN in 2018. IUD in place. Allergies   Allergen Reactions    Amoxicillin Unknown (comments)       Current Outpatient Medications   Medication Sig    predniSONE (STERAPRED DS) 10 mg dose pack See administration instruction per 10mg dose pack    amLODIPine (NORVASC) 5 mg tablet Take 1 Tablet by mouth daily. Indications: high blood pressure    famotidine (PEPCID) 20 mg tablet Take 1 tablet by mouth twice daily    montelukast (SINGULAIR) 10 mg tablet TAKE 1 TABLET BY MOUTH DAILY    valACYclovir (VALTREX) 1 gram tablet 2 tabs PO twice daily for 1 day PRN fever blisters    Cetirizine (ZYRTEC) 10 mg cap Take  by mouth.  fluticasone propionate (FLONASE NA) by Nasal route.  levonorgestrel (MIRENA) 20 mcg/24 hr (5 years) IUD 1 Each by IntraUTERine route once. No current facility-administered medications for this visit. Past Medical History:   Diagnosis Date    Human papillomavirus in conditions classified elsewhere and of unspecified site 2006    Cervix    Menarche age 14    Migraine without aura, without mention of intractable migraine without mention of status migrainosus 2009       Family History   Problem Relation Age of Onset    Elevated Lipids Mother         elevated cholesterol    Hypertension Maternal Aunt        ROS:  Denies fever, chills, cough, chest pain, SOB,  nausea, vomiting, diarrhea, dysuria. Denies rashes, wounds, arthralgias, weakness, numbness, visual changes, depression.   Denies wt loss, wt gain, hemoptysis, hematochezia or melena. Patient is not experiencing chest pain radiating to the jaw and/or down the arms. Physical Examination:    /78 (BP 1 Location: Right arm, BP Patient Position: Sitting, BP Cuff Size: Adult)   Temp 97.3 °F (36.3 °C) (Temporal)   Resp 16   Ht 5' 6\" (1.676 m)   Wt 170 lb 6.4 oz (77.3 kg)   BMI 27.50 kg/m²     Wt Readings from Last 3 Encounters:   04/18/22 170 lb 6.4 oz (77.3 kg)   12/09/21 166 lb 12.8 oz (75.7 kg)   11/16/21 168 lb (76.2 kg)     Constitutional: WDWN Female in no acute distress  HENT:  NC/AT, L TM with clear effusion, R TM pearly gray OP: erythematous  EYES: EOMI, PERRL  Neck:  Supple, no JVD, mass or bruit. No thyromegaly. Respiratory:  Respirations even and unlabored without use of accessory muscles, CTA throughout without wheezes, rales, rubs or rhonchi. Symmetrical chest expansion. Cardiac: RRR   Musculoskeletal:  No cyanosis, clubbing or edema of extremities. Moves all extremities without difficulty. Neurologic:  Smooth, even gait without assistance, CN 2-12 grossly intact. Skin: intact and warm to the touch, no rash   Lymphadenopathy: no cervical or supraclavicular nodes  Psych: Pleasant and appropriate. Judgment normal. Alert and oriented x 3. ASSESSMENT AND PLAN:       ICD-10-CM ICD-9-CM    1. Disorder of left eustachian tube  H69.92 381. 9 predniSONE (STERAPRED DS) 10 mg dose pack   2. Vaginal itching  N89.8 698.1      Discussed eustachian tube dysfunction. Switch Zyrtec to Zyrtec-D just for the next three days and then go back to your usual Zyrtec. I am also sending steroids, start these if you don't get relief with the Zyrtec D. If you finish the steroids and you don't have any relief, let me know.      Refer back to [facility] for vaginal itching, pelvic exam.      Medication Side Effects and Warnings were discussed with patient:  yes  Patient Labs were reviewed:  yes  Patient Past Records were reviewed:  yes    Patient aware of plan of care and verbalized understanding. Questions answered. RTC PRN.     Adamaris Barberutant, NP

## 2022-05-09 ENCOUNTER — OFFICE VISIT (OUTPATIENT)
Dept: FAMILY MEDICINE CLINIC | Age: 37
End: 2022-05-09
Payer: COMMERCIAL

## 2022-05-09 VITALS
HEIGHT: 66 IN | DIASTOLIC BLOOD PRESSURE: 68 MMHG | BODY MASS INDEX: 27 KG/M2 | RESPIRATION RATE: 20 BRPM | SYSTOLIC BLOOD PRESSURE: 130 MMHG | HEART RATE: 96 BPM | WEIGHT: 168 LBS | OXYGEN SATURATION: 99 % | TEMPERATURE: 98.7 F

## 2022-05-09 DIAGNOSIS — E55.9 VITAMIN D DEFICIENCY: ICD-10-CM

## 2022-05-09 DIAGNOSIS — I10 ESSENTIAL HYPERTENSION: ICD-10-CM

## 2022-05-09 DIAGNOSIS — R07.9 CHEST PAIN, UNSPECIFIED TYPE: ICD-10-CM

## 2022-05-09 DIAGNOSIS — M54.6 ACUTE MIDLINE THORACIC BACK PAIN: Primary | ICD-10-CM

## 2022-05-09 DIAGNOSIS — Z82.49 FAMILY HISTORY OF CHF (CONGESTIVE HEART FAILURE): ICD-10-CM

## 2022-05-09 DIAGNOSIS — R53.83 FATIGUE, UNSPECIFIED TYPE: ICD-10-CM

## 2022-05-09 PROCEDURE — 99214 OFFICE O/P EST MOD 30 MIN: CPT | Performed by: NURSE PRACTITIONER

## 2022-05-09 NOTE — PROGRESS NOTES
Chief Complaint   Patient presents with    Back Pain       HPI:    Michael Ibanez is a 44 y.o. female in today with a few concerns. She reports back pain that started last month. She was bending backwards and the next day developed mid-back pain. It comes and goes. She has tried Tylenol with some relief. The pain occurs when she bends her back backwards. She is able to bend forward and twist without issues. She reports chronic fatigue. She feels sluggish. She stopped taking vitamin D but started again a few weeks ago. She goes to bed around 12-1 and wakes up at 6:30. She doesn't nap. She feels like she sleeps soundly. The patient has intermittent anterior chest wall pains for the past several years. EKG, CXR ok. Symptoms come and go. Little improvement with GERD medications. She is on amlodipine for HTN. Mom with hx of cardiomyopathy, CHF, passed away in October 2021. No chest pain at present. Allergies   Allergen Reactions    Amoxicillin Unknown (comments)       Current Outpatient Medications   Medication Sig    amLODIPine (NORVASC) 5 mg tablet Take 1 Tablet by mouth daily. Indications: high blood pressure    famotidine (PEPCID) 20 mg tablet Take 1 tablet by mouth twice daily    montelukast (SINGULAIR) 10 mg tablet TAKE 1 TABLET BY MOUTH DAILY    valACYclovir (VALTREX) 1 gram tablet 2 tabs PO twice daily for 1 day PRN fever blisters    Cetirizine (ZYRTEC) 10 mg cap Take  by mouth.  fluticasone propionate (FLONASE NA) by Nasal route.  levonorgestrel (MIRENA) 20 mcg/24 hr (5 years) IUD 1 Each by IntraUTERine route once. No current facility-administered medications for this visit.        Past Medical History:   Diagnosis Date    Human papillomavirus in conditions classified elsewhere and of unspecified site 2006    Cervix    Menarche age 14    Migraine without aura, without mention of intractable migraine without mention of status migrainosus 2009       Family History   Problem Relation Age of Onset    Elevated Lipids Mother         elevated cholesterol    Hypertension Maternal Aunt        ROS:  Denies fever, chills, cough, chest pain, SOB,  nausea, vomiting, diarrhea, dysuria. Denies rashes, wounds, + arthralgias, weakness, numbness, visual changes, depression. Denies wt loss, wt gain, hemoptysis, hematochezia or melena. Patient is not experiencing chest pain radiating to the jaw and/or down the arms. Physical Examination:    /68 (BP 1 Location: Right arm, BP Patient Position: Sitting, BP Cuff Size: Adult)   Pulse 96   Temp 98.7 °F (37.1 °C) (Temporal)   Resp 20   Ht 5' 6\" (1.676 m)   Wt 168 lb (76.2 kg)   SpO2 99%   BMI 27.12 kg/m²     Wt Readings from Last 3 Encounters:   05/09/22 168 lb (76.2 kg)   04/18/22 170 lb 6.4 oz (77.3 kg)   12/09/21 166 lb 12.8 oz (75.7 kg)     Constitutional: WDWN Female in no acute distress  HENT:  NC/AT  EYES: EOMI, PERRL  Neck:  Supple, no JVD, mass or bruit. No thyromegaly. Respiratory:  Respirations even and unlabored without use of accessory muscles, CTA throughout without wheezes, rales, rubs or rhonchi. Symmetrical chest expansion. Cardiac: RRR no clicks, murmurs, gallops, or rubs  Musculoskeletal:  No cyanosis, clubbing or edema of extremities. Moves all extremities without difficulty. Pain with spinal extension. No palpable muscle spasms. Neurologic:  Smooth, even gait without assistance, CN 2-12 grossly intact. Skin: intact and warm to the touch, no rash   Lymphadenopathy: no cervical or supraclavicular nodes  Psych: Pleasant and appropriate. Judgment normal. Alert and oriented x 3. ASSESSMENT AND PLAN:       ICD-10-CM ICD-9-CM    1. Acute midline thoracic back pain  M54.6 724.1    2.  Fatigue, unspecified type  R53.83 780.79 COLLECTION VENOUS BLOOD,VENIPUNCTURE      TSH 3RD GENERATION      METABOLIC PANEL, COMPREHENSIVE      CBC WITH AUTOMATED DIFF      CBC WITH AUTOMATED DIFF      METABOLIC PANEL, COMPREHENSIVE      TSH 3RD GENERATION      COLLECTION VENOUS BLOOD,VENIPUNCTURE   3. Vitamin D deficiency  E55.9 268.9 COLLECTION VENOUS BLOOD,VENIPUNCTURE      VITAMIN D, 25 HYDROXY      VITAMIN D, 25 HYDROXY      COLLECTION VENOUS BLOOD,VENIPUNCTURE   4. Family history of CHF (congestive heart failure)  Z82.49 V17.49 REFERRAL TO CARDIOLOGY   5. Essential hypertension  I10 401.9 REFERRAL TO CARDIOLOGY   6. Chest pain, unspecified type  R07.9 786.50 REFERRAL TO CARDIOLOGY     Lots to cover. Check labs today. Suspect fatigue is due to lack of sleep. I encouraged good sleep habits, limiting technology, earlier bed time. Aim for 7-8 hours of sleep per night. Refer to cardiology for an evaluation. BP great, heart sounds normal. No relation to activity. No chest pain today. Labs as above. Discussed back pain. She has an Rx for cyclobenzaprine at home. Use QHS along with heat and gentle stretching. If no improvement, take prednisone. Medication Side Effects and Warnings were discussed with patient:  yes  Patient Labs were reviewed:  yes  Patient Past Records were reviewed:  yes    Patient aware of plan of care and verbalized understanding. Questions answered. RTC PRN.     Reida Leyden, NP

## 2022-05-09 NOTE — PATIENT INSTRUCTIONS

## 2022-05-09 NOTE — PROGRESS NOTES
1. \"Have you been to the ER, urgent care clinic since your last visit? Hospitalized since your last visit? \" No    2. \"Have you seen or consulted any other health care providers outside of the [address] [place] [address] since your last visit? \" No     3. For patients aged 39-70: Has the patient had a colonoscopy / FIT/ Cologuard? No      If the patient is female:    4. For patients aged 41-77: Has the patient had a mammogram within the past 2 years? No      5. For patients aged 21-65: Has the patient had a pap smear? Yes - Care Gap present.  Most recent result on file

## 2022-05-11 LAB
25(OH)D3 SERPL-MCNC: 32.1 NG/ML (ref 30–100)
ALBUMIN SERPL-MCNC: 3.7 G/DL (ref 3.5–5)
ALBUMIN/GLOB SERPL: 1.1 {RATIO} (ref 1.1–2.2)
ALP SERPL-CCNC: 81 U/L (ref 45–117)
ALT SERPL-CCNC: 12 U/L (ref 12–78)
ANION GAP SERPL CALC-SCNC: 9 MMOL/L (ref 5–15)
AST SERPL-CCNC: 10 U/L (ref 15–37)
BASOPHILS # BLD: 0 K/UL (ref 0–0.1)
BASOPHILS NFR BLD: 1 % (ref 0–1)
BILIRUB SERPL-MCNC: 0.3 MG/DL (ref 0.2–1)
BUN SERPL-MCNC: 10 MG/DL (ref 6–20)
BUN/CREAT SERPL: 13 (ref 12–20)
CALCIUM SERPL-MCNC: 9.1 MG/DL (ref 8.5–10.1)
CHLORIDE SERPL-SCNC: 109 MMOL/L (ref 97–108)
CO2 SERPL-SCNC: 22 MMOL/L (ref 21–32)
CREAT SERPL-MCNC: 0.78 MG/DL (ref 0.55–1.02)
DIFFERENTIAL METHOD BLD: ABNORMAL
EOSINOPHIL # BLD: 0.2 K/UL (ref 0–0.4)
EOSINOPHIL NFR BLD: 3 % (ref 0–7)
ERYTHROCYTE [DISTWIDTH] IN BLOOD BY AUTOMATED COUNT: 13.3 % (ref 11.5–14.5)
GLOBULIN SER CALC-MCNC: 3.3 G/DL (ref 2–4)
GLUCOSE SERPL-MCNC: 108 MG/DL (ref 65–100)
HCT VFR BLD AUTO: 40.3 % (ref 35–47)
HGB BLD-MCNC: 12.6 G/DL (ref 11.5–16)
IMM GRANULOCYTES # BLD AUTO: 0 K/UL (ref 0–0.04)
IMM GRANULOCYTES NFR BLD AUTO: 0 % (ref 0–0.5)
LYMPHOCYTES # BLD: 1.3 K/UL (ref 0.8–3.5)
LYMPHOCYTES NFR BLD: 21 % (ref 12–49)
MCH RBC QN AUTO: 28.7 PG (ref 26–34)
MCHC RBC AUTO-ENTMCNC: 31.3 G/DL (ref 30–36.5)
MCV RBC AUTO: 91.8 FL (ref 80–99)
MONOCYTES # BLD: 0.5 K/UL (ref 0–1)
MONOCYTES NFR BLD: 7 % (ref 5–13)
NEUTS SEG # BLD: 4.4 K/UL (ref 1.8–8)
NEUTS SEG NFR BLD: 68 % (ref 32–75)
NRBC # BLD: 0 K/UL (ref 0–0.01)
NRBC BLD-RTO: 0 PER 100 WBC
PLATELET # BLD AUTO: 237 K/UL (ref 150–400)
PMV BLD AUTO: 13 FL (ref 8.9–12.9)
POTASSIUM SERPL-SCNC: 3.8 MMOL/L (ref 3.5–5.1)
PROT SERPL-MCNC: 7 G/DL (ref 6.4–8.2)
RBC # BLD AUTO: 4.39 M/UL (ref 3.8–5.2)
SODIUM SERPL-SCNC: 140 MMOL/L (ref 136–145)
TSH SERPL DL<=0.05 MIU/L-ACNC: 1.32 UIU/ML (ref 0.36–3.74)
WBC # BLD AUTO: 6.3 K/UL (ref 3.6–11)

## 2022-05-11 NOTE — PROGRESS NOTES
Please call. Her labs look good. Normal thyroid function, kidneys, liver ok. Vitamin D at goal, continue supplementation at current dose.

## 2022-06-14 NOTE — PROGRESS NOTES
[place], [address]  [phone]  [facility], [address]     Subjective:      Katelyn Elizabeth, [facility]    Licha Estrada is a 44 y.o. female is here for new patient consultation. Pmhx HTN, HLD, allergic rhinitis and migraine. Referred by PCP for cp. HPI 5/9/2022  She reports chronic fatigue. She feels sluggish. She stopped taking vitamin D but started again a few weeks ago. She goes to bed around 12-1 and wakes up at 6:30. She doesn't nap. She feels like she sleeps soundly.     The patient has intermittent anterior chest wall pains for the past several years. EKG, CXR ok. Symptoms come and go. Little improvement with GERD medications. She is on amlodipine for HTN. Mom with hx of cardiomyopathy, CHF, passed away in October 2021      Today, endorses same symptoms--fatigue, anterior nonexertional cp, no sob. The patient denies sob,  orthopnea, PND, LE edema, palpitations, syncope, presyncope.     Patient Active Problem List    Diagnosis Date Noted    Essential hypertension 08/30/2021    Chronic allergic rhinitis 08/30/2021    Elevated liver enzymes 11/28/2018      Jamee Love NP  Past Medical History:   Diagnosis Date    Human papillomavirus in conditions classified elsewhere and of unspecified site 2006    Cervix    Menarche age 14    Migraine without aura, without mention of intractable migraine without mention of status migrainosus 2009      Past Surgical History:   Procedure Laterality Date    HX COLPOSCOPY  03/07/06    With Bx     Allergies   Allergen Reactions    Amoxicillin Unknown (comments)      Family History   Problem Relation Age of Onset    Elevated Lipids Mother         elevated cholesterol    Hypertension Maternal Aunt       Social History     Socioeconomic History    Marital status: SINGLE     Spouse name: Not on file    Number of children: Not on file    Years of education: Not on file    Highest education level: Not on file   Occupational History    Not on file   Tobacco Use    Smoking status: Never Smoker    Smokeless tobacco: Never Used   Substance and Sexual Activity    Alcohol use: Yes    Drug use: No    Sexual activity: Yes     Birth control/protection: I.U.D. Other Topics Concern    Not on file   Social History Narrative    Not on file     Social Determinants of Health     Financial Resource Strain:     Difficulty of Paying Living Expenses: Not on file   Food Insecurity:     Worried About Running Out of Food in the Last Year: Not on file    Helio of Food in the Last Year: Not on file   Transportation Needs:     Lack of Transportation (Medical): Not on file    Lack of Transportation (Non-Medical): Not on file   Physical Activity:     Days of Exercise per Week: Not on file    Minutes of Exercise per Session: Not on file   Stress:     Feeling of Stress : Not on file   Social Connections:     Frequency of Communication with Friends and Family: Not on file    Frequency of Social Gatherings with Friends and Family: Not on file    Attends [employer] Services: Not on file    Active Member of [address] [place] [address] or Organizations: Not on file    Attends Club or Organization Meetings: Not on file    Marital Status: Not on file   Intimate Partner Violence:     Fear of Current or Ex-Partner: Not on file    Emotionally Abused: Not on file    Physically Abused: Not on file    Sexually Abused: Not on file   Housing Stability:     Unable to Pay for Housing in the Last Year: Not on file    Number of [place] in the Last Year: Not on file    Unstable Housing in the Last Year: Not on file      Current Outpatient Medications   Medication Sig    cyclobenzaprine (FLEXERIL) 10 mg tablet Take 1 Tablet by mouth three (3) times daily as needed for Muscle Spasm(s).  amLODIPine (NORVASC) 5 mg tablet Take 1 Tablet by mouth daily. Indications: high blood pressure (Patient taking differently: Take 5 mg by mouth as needed. Indications: high blood pressure)    famotidine (PEPCID) 20 mg tablet Take 1 tablet by mouth twice daily (Patient taking differently: as needed.)    montelukast (SINGULAIR) 10 mg tablet TAKE 1 TABLET BY MOUTH DAILY (Patient taking differently: Take 10 mg by mouth as needed.)    valACYclovir (VALTREX) 1 gram tablet 2 tabs PO twice daily for 1 day PRN fever blisters (Patient taking differently: as needed. 2 tabs PO twice daily for 1 day PRN fever blisters)    Cetirizine (ZYRTEC) 10 mg cap Take  by mouth as needed.  fluticasone propionate (FLONASE NA) by Nasal route as needed.  levonorgestrel (MIRENA) 20 mcg/24 hr (5 years) IUD 1 Each by IntraUTERine route once. No current facility-administered medications for this visit. Review of Symptoms:  11 systems reviewed, negative other than as stated in the HPI    Physical ExamPhysical Exam:    Vitals:    06/16/22 1339   BP: 118/80   Pulse: 77   Resp: 18   Temp: 98.6 °F (37 °C)   TempSrc: Temporal   SpO2: 100%   Weight: 168 lb (76.2 kg)   Height: 5' 6\" (1.676 m)     Body mass index is 27.12 kg/m². General PE   General:  Well developed, in no acute distress, cooperative and alert  HEENT: No carotid bruits, no JVD, trach is midline. Neck Supple, PEERL, EOM intact. Heart:  reg rate and rhythm; normal S1/S2; no murmurs, gallops or rubs. Respiratory: Clear bilaterally x 4, no wheezing or rales  Abdomen:   Soft, non-tender, no distention, no masses. + BS. Extremities:  Normal cap refill, no cyanosis, atraumatic. No edema. Neuro: A&Ox3, speech clear, gait stable. Skin: Skin color is normal. No rashes or lesions.  Non diaphoretic  Vascular: 2+ pulses symmetric in all extremities    Labs:   Lab Results   Component Value Date/Time    Cholesterol, total 166 07/09/2021 11:26 AM    Cholesterol, total 169 08/14/2020 02:15 PM    HDL Cholesterol 56 07/09/2021 11:26 AM    HDL Cholesterol 46 08/14/2020 02:15 PM    LDL, calculated 99.8 07/09/2021 11:26 AM LDL, calculated 111 (H) 08/14/2020 02:15 PM    Triglyceride 51 07/09/2021 11:26 AM    Triglyceride 62 08/14/2020 02:15 PM    CHOL/HDL Ratio 3.0 07/09/2021 11:26 AM     No results found for: CPK, CPKX, CPX  Lab Results   Component Value Date/Time    Sodium 140 05/09/2022 01:50 PM    Potassium 3.8 05/09/2022 01:50 PM    Chloride 109 (H) 05/09/2022 01:50 PM    CO2 22 05/09/2022 01:50 PM    Anion gap 9 05/09/2022 01:50 PM    Glucose 108 (H) 05/09/2022 01:50 PM    BUN 10 05/09/2022 01:50 PM    Creatinine 0.78 05/09/2022 01:50 PM    BUN/Creatinine ratio 13 05/09/2022 01:50 PM    GFR est AA >60 05/09/2022 01:50 PM    GFR est non-AA >60 05/09/2022 01:50 PM    Calcium 9.1 05/09/2022 01:50 PM    Bilirubin, total 0.3 05/09/2022 01:50 PM    Alk. phosphatase 81 05/09/2022 01:50 PM    Protein, total 7.0 05/09/2022 01:50 PM    Albumin 3.7 05/09/2022 01:50 PM    Globulin 3.3 05/09/2022 01:50 PM    A-G Ratio 1.1 05/09/2022 01:50 PM    ALT (SGPT) 12 05/09/2022 01:50 PM       EKG:  NSR      Assessment:     Assessment:        ICD-10-CM ICD-9-CM    1. Precordial pain  R07.2 786.51 AMB POC EKG ROUTINE W/ 12 LEADS, INTER & REP      ECHO ADULT COMPLETE      EXERCISE CARDIAC STRESS TEST   2. Benign essential HTN  I10 401.1 AMB POC EKG ROUTINE W/ 12 LEADS, INTER & REP      ECHO ADULT COMPLETE      EXERCISE CARDIAC STRESS TEST   3. Mixed hyperlipidemia  E78.2 272.2 AMB POC EKG ROUTINE W/ 12 LEADS, INTER & REP      ECHO ADULT COMPLETE      EXERCISE CARDIAC STRESS TEST   4. Gastroesophageal reflux disease, unspecified whether esophagitis present  K21.9 530.81 AMB POC EKG ROUTINE W/ 12 LEADS, INTER & REP      ECHO ADULT COMPLETE      EXERCISE CARDIAC STRESS TEST   5.  Family history of ischemic heart disease  Z82.49 V17.3 ECHO ADULT COMPLETE      EXERCISE CARDIAC STRESS TEST       Orders Placed This Encounter    AMB POC EKG ROUTINE W/ 12 LEADS, INTER & REP     Order Specific Question:   Reason for Exam:     Answer:   new patient        Plan: 1. Precordial pain  Check echo and stress ekg    2. Benign essential HTN  Controlled with amlodipine    3. Mixed hyperlipidemia  7/2021   8/2020   Labs and lipids per PCP    4. Gastroesophageal reflux disease, unspecified whether esophagitis present  On Famotidine    Patient was made aware during visit today that all testing completed would be instantaneously available on their MyChart for review. Discussed that these results will be made available to the provider at the same time. They were advised to wait at least 3 business days to allow for provider's interpretation of results with follow-up before calling our office with concerns about their results.     Continue current care and f/u in 4 mos      Hermes Mix NP

## 2022-06-16 ENCOUNTER — OFFICE VISIT (OUTPATIENT)
Dept: CARDIOLOGY CLINIC | Age: 37
End: 2022-06-16
Payer: COMMERCIAL

## 2022-06-16 VITALS
SYSTOLIC BLOOD PRESSURE: 118 MMHG | HEART RATE: 77 BPM | DIASTOLIC BLOOD PRESSURE: 80 MMHG | BODY MASS INDEX: 27 KG/M2 | OXYGEN SATURATION: 100 % | WEIGHT: 168 LBS | HEIGHT: 66 IN | RESPIRATION RATE: 18 BRPM | TEMPERATURE: 98.6 F

## 2022-06-16 DIAGNOSIS — I10 BENIGN ESSENTIAL HTN: ICD-10-CM

## 2022-06-16 DIAGNOSIS — Z82.49 FAMILY HISTORY OF ISCHEMIC HEART DISEASE: ICD-10-CM

## 2022-06-16 DIAGNOSIS — R07.2 PRECORDIAL PAIN: Primary | ICD-10-CM

## 2022-06-16 DIAGNOSIS — K21.9 GASTROESOPHAGEAL REFLUX DISEASE, UNSPECIFIED WHETHER ESOPHAGITIS PRESENT: ICD-10-CM

## 2022-06-16 DIAGNOSIS — E78.2 MIXED HYPERLIPIDEMIA: ICD-10-CM

## 2022-06-16 PROCEDURE — 99204 OFFICE O/P NEW MOD 45 MIN: CPT | Performed by: NURSE PRACTITIONER

## 2022-06-16 PROCEDURE — 93000 ELECTROCARDIOGRAM COMPLETE: CPT | Performed by: INTERNAL MEDICINE

## 2022-07-22 ENCOUNTER — TELEPHONE (OUTPATIENT)
Dept: CARDIOLOGY CLINIC | Age: 37
End: 2022-07-22

## 2022-07-22 ENCOUNTER — HOSPITAL ENCOUNTER (OUTPATIENT)
Dept: NON INVASIVE DIAGNOSTICS | Age: 37
Discharge: HOME | End: 2022-07-22
Attending: NURSE PRACTITIONER
Payer: COMMERCIAL

## 2022-07-22 VITALS
BODY MASS INDEX: 27 KG/M2 | DIASTOLIC BLOOD PRESSURE: 96 MMHG | WEIGHT: 168 LBS | SYSTOLIC BLOOD PRESSURE: 130 MMHG | HEIGHT: 66 IN

## 2022-07-22 DIAGNOSIS — K21.9 GASTROESOPHAGEAL REFLUX DISEASE, UNSPECIFIED WHETHER ESOPHAGITIS PRESENT: ICD-10-CM

## 2022-07-22 DIAGNOSIS — E78.2 MIXED HYPERLIPIDEMIA: ICD-10-CM

## 2022-07-22 DIAGNOSIS — R07.2 PRECORDIAL PAIN: ICD-10-CM

## 2022-07-22 DIAGNOSIS — I10 BENIGN ESSENTIAL HTN: ICD-10-CM

## 2022-07-22 DIAGNOSIS — Z82.49 FAMILY HISTORY OF ISCHEMIC HEART DISEASE: ICD-10-CM

## 2022-07-22 LAB
ECHO AO ROOT DIAM: 3.3 CM
ECHO AO ROOT INDEX: 1.77 CM/M2
ECHO AV AREA PEAK VELOCITY: 1.9 CM2
ECHO AV AREA/BSA PEAK VELOCITY: 1 CM2/M2
ECHO AV PEAK GRADIENT: 9 MMHG
ECHO AV PEAK VELOCITY: 1.5 M/S
ECHO AV VELOCITY RATIO: 0.93
ECHO LA DIAMETER INDEX: 1.83 CM/M2
ECHO LA DIAMETER: 3.4 CM
ECHO LA TO AORTIC ROOT RATIO: 1.03
ECHO LA VOL 2C: 41 ML (ref 22–52)
ECHO LA VOL 4C: 58 ML (ref 22–52)
ECHO LA VOL BP: 56 ML (ref 22–52)
ECHO LA VOL/BSA BIPLANE: 30 ML/M2 (ref 16–34)
ECHO LA VOLUME AREA LENGTH: 64 ML
ECHO LA VOLUME INDEX A2C: 22 ML/M2 (ref 16–34)
ECHO LA VOLUME INDEX A4C: 31 ML/M2 (ref 16–34)
ECHO LA VOLUME INDEX AREA LENGTH: 34 ML/M2 (ref 16–34)
ECHO LV EJECTION FRACTION A2C: 67 %
ECHO LV EJECTION FRACTION A4C: 46 %
ECHO LV FRACTIONAL SHORTENING: 30 % (ref 28–44)
ECHO LV INTERNAL DIMENSION DIASTOLE INDEX: 2.31 CM/M2
ECHO LV INTERNAL DIMENSION DIASTOLIC: 4.3 CM (ref 3.9–5.3)
ECHO LV INTERNAL DIMENSION SYSTOLIC INDEX: 1.61 CM/M2
ECHO LV INTERNAL DIMENSION SYSTOLIC: 3 CM
ECHO LV IVSD: 0.8 CM (ref 0.6–0.9)
ECHO LV MASS 2D: 114.2 G (ref 67–162)
ECHO LV MASS INDEX 2D: 61.4 G/M2 (ref 43–95)
ECHO LV POSTERIOR WALL DIASTOLIC: 0.9 CM (ref 0.6–0.9)
ECHO LV RELATIVE WALL THICKNESS RATIO: 0.42
ECHO LVOT AREA: 2.3 CM2
ECHO LVOT CARDIAC OUTPUT: 3.5 LITER/MINUTE
ECHO LVOT CARDIAC OUTPUT: 3.5 LITER/MINUTE
ECHO LVOT CARDIAC OUTPUT: 4.3 LITER/MINUTE
ECHO LVOT CARDIAC OUTPUT: 4.3 LITER/MINUTE
ECHO LVOT CARDIAC OUTPUT: 5.2 LITER/MINUTE
ECHO LVOT CARDIAC OUTPUT: 5.2 LITER/MINUTE
ECHO LVOT DIAM: 1.7 CM
ECHO LVOT PEAK GRADIENT: 7 MMHG
ECHO LVOT PEAK VELOCITY: 1.4 M/S
ECHO MV A VELOCITY: 0.83 M/S
ECHO MV E DECELERATION TIME (DT): 169.1 MS
ECHO MV E VELOCITY: 0.97 M/S
ECHO MV E/A RATIO: 1.17
ECHO PV MAX VELOCITY: 0.8 M/S
ECHO PV PEAK GRADIENT: 3 MMHG
ECHO RV FREE WALL PEAK S': 18 CM/S
ECHO RV INTERNAL DIMENSION: 3.3 CM
ECHO RV TAPSE: 2.8 CM (ref 1.7–?)
ECHO TV REGURGITANT MAX VELOCITY: 2.38 M/S
ECHO TV REGURGITANT PEAK GRADIENT: 23 MMHG

## 2022-07-22 PROCEDURE — 93306 TTE W/DOPPLER COMPLETE: CPT

## 2022-07-22 NOTE — TELEPHONE ENCOUNTER
----- Message from Leigh Taylor NP sent at 7/22/2022  2:29 PM EDT -----  Echo looks great. Normal heart pumping strength, valves look good and healthy    Spoke with the patient. Verified patient with two patient identifiers. Results given and questions answered. Advised to proceed with stress testing. Patient verbalized understanding.

## 2022-08-23 DIAGNOSIS — I10 ESSENTIAL HYPERTENSION: ICD-10-CM

## 2022-08-23 RX ORDER — AMLODIPINE BESYLATE 5 MG/1
TABLET ORAL
Qty: 90 TABLET | Refills: 4 | OUTPATIENT
Start: 2022-08-23

## 2022-08-23 RX ORDER — AMLODIPINE BESYLATE 5 MG/1
5 TABLET ORAL DAILY
Qty: 90 TABLET | Refills: 4 | Status: SHIPPED | OUTPATIENT
Start: 2022-08-23 | End: 2022-10-19 | Stop reason: SDUPTHER

## 2022-10-12 NOTE — PROGRESS NOTES
[place] [address], [address]  [phone]  [facility], [address]     Subjective:      Chance Wesley, [facility]    Cinthya Heaton is a 44 y.o. female is here for Follow up. Pmhx HTN, HLD, allergic rhinitis and migraine. Referred by PCP for cp. HPI 5/9/2022  She reports chronic fatigue. She feels sluggish. She stopped taking vitamin D but started again a few weeks ago. She goes to bed around 12-1 and wakes up at 6:30. She doesn't nap. She feels like she sleeps soundly. The patient has intermittent anterior chest wall pains for the past several years. EKG, CXR ok. Symptoms come and go. Little improvement with GERD medications. She is on amlodipine for HTN. Mom with hx of cardiomyopathy, CHF, passed away in October 2021    Seen by me in 6/2022  endorses same symptoms--fatigue, anterior nonexertional cp, no sob. Echo was fine,  stress test not done      Today,  still c/o intermittent cp,  not sure if related to her heart or anxiety. Not specifically related with activity. No sob    The patient denies shortness of breath, orthopnea, PND, LE edema, palpitations, syncope, presyncope or fatigue.          Patient Active Problem List    Diagnosis Date Noted    Essential hypertension 08/30/2021    Chronic allergic rhinitis 08/30/2021    Elevated liver enzymes 11/28/2018      Jessica Alarcon NP  Past Medical History:   Diagnosis Date    Human papillomavirus in conditions classified elsewhere and of unspecified site 2006    Cervix    Menarche age 14    Migraine without aura, without mention of intractable migraine without mention of status migrainosus 2009      Past Surgical History:   Procedure Laterality Date    HX COLPOSCOPY  03/07/06    With Bx     Allergies   Allergen Reactions    Amoxicillin Unknown (comments)      Family History   Problem Relation Age of Onset    Elevated Lipids Mother         elevated cholesterol    Hypertension Maternal Aunt       Social History     Socioeconomic History    Marital status: SINGLE     Spouse name: Not on file    Number of children: Not on file    Years of education: Not on file    Highest education level: Not on file   Occupational History    Not on file   Tobacco Use    Smoking status: Never    Smokeless tobacco: Never   Substance and Sexual Activity    Alcohol use: Yes    Drug use: No    Sexual activity: Yes     Birth control/protection: I.U.D. Other Topics Concern    Not on file   Social History Narrative    Not on file     Social Determinants of Health     Financial Resource Strain: Not on file   Food Insecurity: Not on file   Transportation Needs: Not on file   Physical Activity: Not on file   Stress: Not on file   Social Connections: Not on file   Intimate Partner Violence: Not on file   Housing Stability: Not on file      Current Outpatient Medications   Medication Sig    amLODIPine (NORVASC) 5 mg tablet Take 1 Tablet by mouth daily. Indications: high blood pressure    cyclobenzaprine (FLEXERIL) 10 mg tablet Take 1 Tablet by mouth three (3) times daily as needed for Muscle Spasm(s). famotidine (PEPCID) 20 mg tablet Take 1 tablet by mouth twice daily (Patient taking differently: as needed.)    montelukast (SINGULAIR) 10 mg tablet TAKE 1 TABLET BY MOUTH DAILY (Patient taking differently: Take 10 mg by mouth as needed.)    valACYclovir (VALTREX) 1 gram tablet 2 tabs PO twice daily for 1 day PRN fever blisters (Patient taking differently: as needed. 2 tabs PO twice daily for 1 day PRN fever blisters)    Cetirizine (ZYRTEC) 10 mg cap Take  by mouth as needed. fluticasone propionate (FLONASE NA) by Nasal route as needed. levonorgestrel (MIRENA) 20 mcg/24 hr (5 years) IUD 1 Each by IntraUTERine route once. No current facility-administered medications for this visit.            Review of Symptoms:  11 systems reviewed, negative other than as stated in the HPI    Physical ExamPhysical Exam:    There were no vitals filed for this visit. There is no height or weight on file to calculate BMI. General PE   General:  Well developed, in no acute distress, cooperative and alert  HEENT: No carotid bruits, no JVD, trach is midline. Neck Supple, PEERL, EOM intact. Heart:  reg rate and rhythm; normal S1/S2; no murmurs, gallops or rubs. Respiratory: Clear bilaterally x 4, no wheezing or rales  Abdomen:   Soft, non-tender, no distention, no masses. + BS. Extremities:  Normal cap refill, no cyanosis, atraumatic. No edema. Neuro: A&Ox3, speech clear, gait stable. Skin: Skin color is normal. No rashes or lesions. Non diaphoretic  Vascular: 2+ pulses symmetric in all extremities    Labs:   Lab Results   Component Value Date/Time    Cholesterol, total 166 07/09/2021 11:26 AM    Cholesterol, total 169 08/14/2020 02:15 PM    HDL Cholesterol 56 07/09/2021 11:26 AM    HDL Cholesterol 46 08/14/2020 02:15 PM    LDL, calculated 99.8 07/09/2021 11:26 AM    LDL, calculated 111 (H) 08/14/2020 02:15 PM    Triglyceride 51 07/09/2021 11:26 AM    Triglyceride 62 08/14/2020 02:15 PM    CHOL/HDL Ratio 3.0 07/09/2021 11:26 AM     No results found for: CPK, CPKX, CPX  Lab Results   Component Value Date/Time    Sodium 140 05/09/2022 01:50 PM    Potassium 3.8 05/09/2022 01:50 PM    Chloride 109 (H) 05/09/2022 01:50 PM    CO2 22 05/09/2022 01:50 PM    Anion gap 9 05/09/2022 01:50 PM    Glucose 108 (H) 05/09/2022 01:50 PM    BUN 10 05/09/2022 01:50 PM    Creatinine 0.78 05/09/2022 01:50 PM    BUN/Creatinine ratio 13 05/09/2022 01:50 PM    GFR est AA >60 05/09/2022 01:50 PM    GFR est non-AA >60 05/09/2022 01:50 PM    Calcium 9.1 05/09/2022 01:50 PM    Bilirubin, total 0.3 05/09/2022 01:50 PM    Alk.  phosphatase 81 05/09/2022 01:50 PM    Protein, total 7.0 05/09/2022 01:50 PM    Albumin 3.7 05/09/2022 01:50 PM    Globulin 3.3 05/09/2022 01:50 PM    A-G Ratio 1.1 05/09/2022 01:50 PM    ALT (SGPT) 12 05/09/2022 01:50 PM       EKG:       Assessment: Assessment:        ICD-10-CM ICD-9-CM    1. Precordial pain  R07.2 786.51       2. Benign essential HTN  I10 401.1       3. Mixed hyperlipidemia  E78.2 272.2       4. Family history of ischemic heart disease  Z82.49 V17.3       5. Gastroesophageal reflux disease, unspecified whether esophagitis present  K21.9 530.81           No orders of the defined types were placed in this encounter. 07/22/22    ECHO ADULT COMPLETE 07/22/2022 7/22/2022    Interpretation Summary    Left Ventricle: Normal left ventricular systolic function with a visually estimated EF of 60 - 65%. Left ventricle size is normal. Normal wall thickness. Normal wall motion. Normal diastolic function. Left Atrium: Left atrium is dilated. Signed by: Kathi Gomez MD on 7/22/2022  2:15 PM         Plan:     Precordial pain  LVEF 60-65% LAE valves ok [er echo 7/2022  Reorder routine stress test    Benign essential HTN  Will check home BP---am and pm reading,  continue same for now  She will call next week re readings    Mixed hyperlipidemia  7/2021   8/2020   Labs and lipids per PCP    Gastroesophageal reflux disease, unspecified whether esophagitis present  On Famotidine    Patient was made aware during visit today that all testing completed would be instantaneously available on their MyChart for review. Discussed that these results will be made available to the provider at the same time. They were advised to wait at least 3 business days to allow for provider's interpretation of results with follow-up before calling our office with concerns about their results.     Continue current care and f/u in 2 mos      Brandon Trejo NP

## 2022-10-19 ENCOUNTER — OFFICE VISIT (OUTPATIENT)
Dept: CARDIOLOGY CLINIC | Age: 37
End: 2022-10-19
Payer: COMMERCIAL

## 2022-10-19 VITALS
WEIGHT: 172 LBS | OXYGEN SATURATION: 98 % | HEART RATE: 78 BPM | BODY MASS INDEX: 27.64 KG/M2 | HEIGHT: 66 IN | RESPIRATION RATE: 18 BRPM | DIASTOLIC BLOOD PRESSURE: 90 MMHG | SYSTOLIC BLOOD PRESSURE: 130 MMHG | TEMPERATURE: 97 F

## 2022-10-19 DIAGNOSIS — R07.2 PRECORDIAL PAIN: Primary | ICD-10-CM

## 2022-10-19 DIAGNOSIS — K21.9 GASTROESOPHAGEAL REFLUX DISEASE, UNSPECIFIED WHETHER ESOPHAGITIS PRESENT: ICD-10-CM

## 2022-10-19 DIAGNOSIS — E78.2 MIXED HYPERLIPIDEMIA: ICD-10-CM

## 2022-10-19 DIAGNOSIS — I10 BENIGN ESSENTIAL HTN: ICD-10-CM

## 2022-10-19 DIAGNOSIS — I10 ESSENTIAL HYPERTENSION: ICD-10-CM

## 2022-10-19 DIAGNOSIS — Z82.49 FAMILY HISTORY OF ISCHEMIC HEART DISEASE: ICD-10-CM

## 2022-10-19 PROCEDURE — 99214 OFFICE O/P EST MOD 30 MIN: CPT | Performed by: NURSE PRACTITIONER

## 2022-10-19 RX ORDER — AMLODIPINE BESYLATE 5 MG/1
5 TABLET ORAL DAILY
Qty: 90 TABLET | Refills: 4 | Status: SHIPPED | OUTPATIENT
Start: 2022-10-19

## 2022-10-19 NOTE — PROGRESS NOTES
Identified pt with two pt identifiers(name and ). Reviewed record in preparation for visit and have obtained necessary documentation. Chief Complaint   Patient presents with    Hypertension     5 month follow up    Palpitations     Intermittent palps \"pounding\"      Vitals:    10/19/22 1345 10/19/22 1355   BP: (!) 138/90 (!) 130/90   Pulse: 78    Resp: 18    Temp: 97 °F (36.1 °C)    TempSrc: Temporal    SpO2: 98%    Weight: 172 lb (78 kg)    Height: 5' 6\" (1.676 m)    PainSc:   0 - No pain        Medications reviewed/approved by provider. Health Maintenance Review: Patient reminded of \"due or due soon\" health maintenance. I have asked the patient to contact his/her primary care provider (PCP) for follow-up on his/her health maintenance. Coordination of Care Questionnaire:  :   1) Have you been to an emergency room, urgent care, or hospitalized since your last visit? If yes, where when, and reason for visit? no       2. Have seen or consulted any other health care provider since your last visit? If yes, where when, and reason for visit? NO      Patient is accompanied by self I have received verbal consent from April Kelley to discuss any/all medical information while they are present in the room.

## 2022-10-19 NOTE — TELEPHONE ENCOUNTER
[address] prescription requested. Message routed to provider to approve refill. Requested Prescriptions     Pending Prescriptions Disp Refills    amLODIPine (NORVASC) 5 mg tablet 90 Tablet 4     Sig: Take 1 Tablet by mouth daily.  Indications: high blood pressure

## 2022-10-26 NOTE — TELEPHONE ENCOUNTER
----- Message from Mary Edwards sent at 11/15/2021  2:24 PM EST -----  Subject: Appointment Request    Reason for Call: Routine Existing Condition Follow Up    QUESTIONS  Type of Appointment? Established Patient  Reason for appointment request? Available appointments did not meet   patient need  Additional Information for Provider? Patient called in to be seen for   blood pressure medication f/u and mentioned having chest pains   ---------------------------------------------------------------------------  --------------  CALL BACK INFO  What is the best way for the office to contact you? OK to leave message on   voicemail  Preferred Call Back Phone Number? [phone]  ---------------------------------------------------------------------------  --------------  SCRIPT ANSWERS  Relationship to Patient? Self  (Is the patient requesting to be seen urgently for their symptoms?)? No  Is this follow up request related to routine Diabetes Management? No  Are you having any new concerns about your existing condition? No  Have you been diagnosed with, awaiting test results for, or told that you   are suspected of having COVID-19 (Coronavirus)? (If patient has tested   negative or was tested as a requirement for work, school, or travel and   not based on symptoms, answer no)? No  Within the past two weeks have you developed any of the following symptoms   (answer no if symptoms have been present longer than 2 weeks or began   more than 2 weeks ago)? Fever or Chills, Cough, Shortness of breath or   difficulty breathing, Loss of taste or smell, Sore throat, Nasal   congestion, Sneezing or runny nose, Fatigue or generalized body aches   (answer no if pain is specific to a body part e.g. back pain), Diarrhea,   Headache? No  Have you had close contact with someone with COVID-19 in the last 14 days? No  (Service Expert  click yes below to proceed with Mobile [employer] As Usual   Scheduling)?  Yes
Please triage pt on chest pains because no open appt utill after thanksgiving unless approval to work in
VV scheduled with Jenny Acevedo
[id number]

## 2022-11-07 ENCOUNTER — CLINICAL SUPPORT (OUTPATIENT)
Dept: FAMILY MEDICINE CLINIC | Age: 37
End: 2022-11-07
Payer: COMMERCIAL

## 2022-11-07 ENCOUNTER — VIRTUAL VISIT (OUTPATIENT)
Dept: FAMILY MEDICINE CLINIC | Age: 37
End: 2022-11-07
Payer: COMMERCIAL

## 2022-11-07 DIAGNOSIS — J30.1 NON-SEASONAL ALLERGIC RHINITIS DUE TO POLLEN: Primary | ICD-10-CM

## 2022-11-07 DIAGNOSIS — Z20.828 EXPOSURE TO THE FLU: Primary | ICD-10-CM

## 2022-11-07 LAB
FLUAV+FLUBV AG NOSE QL IA.RAPID: NEGATIVE
FLUAV+FLUBV AG NOSE QL IA.RAPID: NEGATIVE
VALID INTERNAL CONTROL?: YES

## 2022-11-07 PROCEDURE — 96372 THER/PROPH/DIAG INJ SC/IM: CPT | Performed by: NURSE PRACTITIONER

## 2022-11-07 PROCEDURE — 99442 PR PHYS/QHP TELEPHONE EVALUATION 11-20 MIN: CPT | Performed by: NURSE PRACTITIONER

## 2022-11-07 PROCEDURE — 87804 INFLUENZA ASSAY W/OPTIC: CPT | Performed by: NURSE PRACTITIONER

## 2022-11-07 RX ORDER — DEXAMETHASONE SODIUM PHOSPHATE 4 MG/ML
4 INJECTION, SOLUTION INTRA-ARTICULAR; INTRALESIONAL; INTRAMUSCULAR; INTRAVENOUS; SOFT TISSUE ONCE
Status: DISCONTINUED | OUTPATIENT
Start: 2022-11-07 | End: 2022-11-07

## 2022-11-07 RX ORDER — METHYLPREDNISOLONE ACETATE 40 MG/ML
40 INJECTION, SUSPENSION INTRA-ARTICULAR; INTRALESIONAL; INTRAMUSCULAR; SOFT TISSUE ONCE
Status: CANCELLED | OUTPATIENT
Start: 2022-11-07 | End: 2022-11-08

## 2022-11-07 RX ORDER — METHYLPREDNISOLONE ACETATE 40 MG/ML
40 INJECTION, SUSPENSION INTRA-ARTICULAR; INTRALESIONAL; INTRAMUSCULAR; SOFT TISSUE ONCE
Status: DISCONTINUED | OUTPATIENT
Start: 2022-11-07 | End: 2022-11-07

## 2022-11-07 RX ORDER — DEXAMETHASONE SODIUM PHOSPHATE 4 MG/ML
4 INJECTION, SOLUTION INTRA-ARTICULAR; INTRALESIONAL; INTRAMUSCULAR; INTRAVENOUS; SOFT TISSUE ONCE
Status: CANCELLED | OUTPATIENT
Start: 2022-11-07 | End: 2022-11-08

## 2022-11-07 RX ADMIN — METHYLPREDNISOLONE ACETATE 40 MG: 40 INJECTION, SUSPENSION INTRA-ARTICULAR; INTRALESIONAL; INTRAMUSCULAR; SOFT TISSUE at 17:29

## 2022-11-07 NOTE — PROGRESS NOTES
Methyprednisone 40mg administered in left gluteus. Patient tolerated medication well. AVS provided to patient with medication information. Patient states understanding.

## 2022-11-07 NOTE — PROGRESS NOTES
Chief Complaint   Patient presents with    Flu     Wants a flu test for her to be cleared to get her allergy injections. She was exposed to a chid with flu     1. \"Have you been to the ER, urgent care clinic since your last visit? Hospitalized since your last visit? \" No    2. \"Have you seen or consulted any other health care providers outside of the [address] [place] [address] since your last visit? \" No     3. For patients aged 39-70: Has the patient had a colonoscopy / FIT/ Cologuard? NA - based on age      If the patient is female:    4. For patients aged 41-77: Has the patient had a mammogram within the past 2 years? NA - based on age or sex      11. For patients aged 21-65: Has the patient had a pap smear?  Yes - no Care Gap present

## 2022-11-08 NOTE — PROGRESS NOTES
Kayce Golden is a 44 y.o. female, evaluated via audio-only technology on 11/7/2022 for Flu (Wants a flu test for her to be cleared to get her allergy injections. She was exposed to a chid with flu)  . Ms. Lena Sage denies fever, body aches or chills. PCP Ari Metcalf ordered seasonal allergy steroid injection. F  Assessment & Plan:   Diagnoses and all orders for this visit:    1. Exposure to the flu  -     AMB POC OCHOA INFLUENZA A/B TEST      The complexity of medical decision making for this visit is moderate       12  Subjective:       Prior to Admission medications    Medication Sig Start Date End Date Taking? Authorizing Provider   amLODIPine (NORVASC) 5 mg tablet Take 1 Tablet by mouth daily. Indications: high blood pressure 10/19/22  Yes Sophia Lea NP   cyclobenzaprine (FLEXERIL) 10 mg tablet Take 1 Tablet by mouth three (3) times daily as needed for Muscle Spasm(s). 5/10/22  Yes Sophia Lea NP   famotidine (PEPCID) 20 mg tablet Take 1 tablet by mouth twice daily  Patient taking differently: as needed. 8/9/21  Yes Sophia Lea NP   montelukast (SINGULAIR) 10 mg tablet TAKE 1 TABLET BY MOUTH DAILY  Patient taking differently: Take 10 mg by mouth as needed. 8/9/21  Yes Sophia Lea NP   valACYclovir (VALTREX) 1 gram tablet 2 tabs PO twice daily for 1 day PRN fever blisters  Patient taking differently: as needed. 2 tabs PO twice daily for 1 day PRN fever blisters 6/10/21  Yes Sophia Lea NP   Cetirizine 10 mg cap Take  by mouth as needed. Yes Provider, Historical   fluticasone propionate (FLONASE NA) by Nasal route as needed. Yes Provider, Historical   levonorgestreL (MIRENA) 20 mcg/24 hours (8 yrs) 52 mg IUD 1 Each by IntraUTERine route once.    Yes Provider, Historical     Patient Active Problem List   Diagnosis Code    Elevated liver enzymes R74.8    Essential hypertension I10    Chronic allergic rhinitis J30.9     Patient Active Problem List Diagnosis Date Noted    Essential hypertension 08/30/2021    Chronic allergic rhinitis 08/30/2021    Elevated liver enzymes 11/28/2018     Current Outpatient Medications   Medication Sig Dispense Refill    amLODIPine (NORVASC) 5 mg tablet Take 1 Tablet by mouth daily. Indications: high blood pressure 90 Tablet 4    cyclobenzaprine (FLEXERIL) 10 mg tablet Take 1 Tablet by mouth three (3) times daily as needed for Muscle Spasm(s). 15 Tablet 0    famotidine (PEPCID) 20 mg tablet Take 1 tablet by mouth twice daily (Patient taking differently: as needed.) 60 Tablet 5    montelukast (SINGULAIR) 10 mg tablet TAKE 1 TABLET BY MOUTH DAILY (Patient taking differently: Take 10 mg by mouth as needed.) 30 Tablet 5    valACYclovir (VALTREX) 1 gram tablet 2 tabs PO twice daily for 1 day PRN fever blisters (Patient taking differently: as needed. 2 tabs PO twice daily for 1 day PRN fever blisters) 20 Tablet 1    Cetirizine 10 mg cap Take  by mouth as needed. fluticasone propionate (FLONASE NA) by Nasal route as needed. levonorgestreL (MIRENA) 20 mcg/24 hours (8 yrs) 52 mg IUD 1 Each by IntraUTERine route once.        Allergies   Allergen Reactions    Amoxicillin Unknown (comments)     Past Medical History:   Diagnosis Date    Human papillomavirus in conditions classified elsewhere and of unspecified site 2006    Cervix    Menarche age 14    Migraine without aura, without mention of intractable migraine without mention of status migrainosus 2009     Past Surgical History:   Procedure Laterality Date    HX COLPOSCOPY  03/07/06    With Bx     Family History   Problem Relation Age of Onset    Elevated Lipids Mother         elevated cholesterol    Hypertension Maternal Aunt      Social History     Tobacco Use    Smoking status: Never    Smokeless tobacco: Never   Substance Use Topics    Alcohol use: Yes       ROS    Patient-Reported Temperature: reports no fever  Patient-Reported LMP: no periods, Mirena       [employer] was evaluated through a patient-initiated, synchronous (real-time) audio only encounter. She (or guardian if applicable) is aware that it is a billable service, which includes applicable co-pays, with coverage as determined by her insurance carrier. This visit was conducted with the patient's (and/or Leilani Carlson guardian's) verbal consent. She has not had a related appointment within my department in the past 7 days or scheduled within the next 24 hours. The patient was located in a state where the provider was licensed to provide care. The patient was located at: Home: [address] [place] [address].  [employer]ia [employer]  The provider was located at:  Facility (Appt Department): [id number] [id number]    Total Time: minutes: 11-20 minutes    Manny Hansen NP

## 2022-11-28 ENCOUNTER — TRANSCRIBE ORDER (OUTPATIENT)
Dept: SCHEDULING | Age: 37
End: 2022-11-28

## 2022-11-28 DIAGNOSIS — N63.32 UNSPECIFIED LUMP IN AXILLARY TAIL OF THE LEFT BREAST: Primary | ICD-10-CM

## 2022-12-12 NOTE — PROGRESS NOTES
[address], [address]  [phone]  [facility], [address]     Subjective:      Patti Parra, [facility]    Mack Huddleston is a 44 y.o. female is here for Follow up. Pmhx HTN, HLD, allergic rhinitis and migraine. Referred by PCP for cp. HPI 5/9/2022  She reports chronic fatigue. She feels sluggish. She stopped taking vitamin D but started again a few weeks ago. She goes to bed around 12-1 and wakes up at 6:30. She doesn't nap. She feels like she sleeps soundly. The patient has intermittent anterior chest wall pains for the past several years. EKG, CXR ok. Symptoms come and go. Little improvement with GERD medications. She is on amlodipine for HTN. Mom with hx of cardiomyopathy, CHF, passed away in October 2021    Seen by me in 6/2022  endorses same symptoms--fatigue, anterior nonexertional cp, no sob. Echo was fine,  stress test not done    Seen by me in 10/2022, reordered stress test as still c/o intermittent cp    Today,   No further cp. She did the 5K and no exertional symptoms. Bp running high. The patient denies shortness of breath, orthopnea, PND, LE edema, palpitations, syncope, presyncope or fatigue.          Patient Active Problem List    Diagnosis Date Noted    Essential hypertension 08/30/2021    Chronic allergic rhinitis 08/30/2021    Elevated liver enzymes 11/28/2018      Rosie Ugarte NP  Past Medical History:   Diagnosis Date    Human papillomavirus in conditions classified elsewhere and of unspecified site 2006    Cervix    Menarche age 14    Migraine without aura, without mention of intractable migraine without mention of status migrainosus 2009      Past Surgical History:   Procedure Laterality Date    HX COLPOSCOPY  03/07/06    With Bx     Allergies   Allergen Reactions    Amoxicillin Unknown (comments)      Family History   Problem Relation Age of Onset    Elevated Lipids Mother         elevated cholesterol    Hypertension Maternal Aunt       Social History     Socioeconomic History    Marital status: SINGLE     Spouse name: Not on file    Number of children: Not on file    Years of education: Not on file    Highest education level: Not on file   Occupational History    Not on file   Tobacco Use    Smoking status: Never    Smokeless tobacco: Never   Substance and Sexual Activity    Alcohol use: Yes    Drug use: No    Sexual activity: Yes     Birth control/protection: I.U.D. Other Topics Concern    Not on file   Social History Narrative    Not on file     Social Determinants of Health     Financial Resource Strain: Not on file   Food Insecurity: Not on file   Transportation Needs: Not on file   Physical Activity: Not on file   Stress: Not on file   Social Connections: Not on file   Intimate Partner Violence: Not on file   Housing Stability: Not on file      Current Outpatient Medications   Medication Sig    amLODIPine (NORVASC) 5 mg tablet Take 1 Tablet by mouth daily. Indications: high blood pressure    cyclobenzaprine (FLEXERIL) 10 mg tablet Take 1 Tablet by mouth three (3) times daily as needed for Muscle Spasm(s). famotidine (PEPCID) 20 mg tablet Take 1 tablet by mouth twice daily (Patient taking differently: as needed.)    montelukast (SINGULAIR) 10 mg tablet TAKE 1 TABLET BY MOUTH DAILY (Patient taking differently: Take 10 mg by mouth as needed.)    valACYclovir (VALTREX) 1 gram tablet 2 tabs PO twice daily for 1 day PRN fever blisters (Patient taking differently: as needed. 2 tabs PO twice daily for 1 day PRN fever blisters)    Cetirizine 10 mg cap Take  by mouth as needed. fluticasone propionate (FLONASE NA) by Nasal route as needed. levonorgestreL (MIRENA) 20 mcg/24 hours (8 yrs) 52 mg IUD 1 Each by IntraUTERine route once. No current facility-administered medications for this visit.            Review of Symptoms:  11 systems reviewed, negative other than as stated in the HPI    Physical ExamPhysical Exam: There were no vitals filed for this visit. There is no height or weight on file to calculate BMI. General PE   General:  Well developed, in no acute distress, cooperative and alert  HEENT: No carotid bruits, no JVD, trach is midline. Neck Supple, PEERL, EOM intact. Heart:  reg rate and rhythm; normal S1/S2; no murmurs, gallops or rubs. Respiratory: Clear bilaterally x 4, no wheezing or rales  Abdomen:   Soft, non-tender, no distention, no masses. + BS. Extremities:  Normal cap refill, no cyanosis, atraumatic. No edema. Neuro: A&Ox3, speech clear, gait stable. Skin: Skin color is normal. No rashes or lesions. Non diaphoretic  Vascular: 2+ pulses symmetric in all extremities    Labs:   Lab Results   Component Value Date/Time    Cholesterol, total 166 07/09/2021 11:26 AM    Cholesterol, total 169 08/14/2020 02:15 PM    HDL Cholesterol 56 07/09/2021 11:26 AM    HDL Cholesterol 46 08/14/2020 02:15 PM    LDL, calculated 99.8 07/09/2021 11:26 AM    LDL, calculated 111 (H) 08/14/2020 02:15 PM    Triglyceride 51 07/09/2021 11:26 AM    Triglyceride 62 08/14/2020 02:15 PM    CHOL/HDL Ratio 3.0 07/09/2021 11:26 AM     No results found for: CPK, CPKX, CPX  Lab Results   Component Value Date/Time    Sodium 140 05/09/2022 01:50 PM    Potassium 3.8 05/09/2022 01:50 PM    Chloride 109 (H) 05/09/2022 01:50 PM    CO2 22 05/09/2022 01:50 PM    Anion gap 9 05/09/2022 01:50 PM    Glucose 108 (H) 05/09/2022 01:50 PM    BUN 10 05/09/2022 01:50 PM    Creatinine 0.78 05/09/2022 01:50 PM    BUN/Creatinine ratio 13 05/09/2022 01:50 PM    GFR est AA >60 05/09/2022 01:50 PM    GFR est non-AA >60 05/09/2022 01:50 PM    Calcium 9.1 05/09/2022 01:50 PM    Bilirubin, total 0.3 05/09/2022 01:50 PM    Alk.  phosphatase 81 05/09/2022 01:50 PM    Protein, total 7.0 05/09/2022 01:50 PM    Albumin 3.7 05/09/2022 01:50 PM    Globulin 3.3 05/09/2022 01:50 PM    A-G Ratio 1.1 05/09/2022 01:50 PM    ALT (SGPT) 12 05/09/2022 01:50 PM EKG:       Assessment:     Assessment:        ICD-10-CM ICD-9-CM    1. Precordial pain  R07.2 786.51       2. Benign essential HTN  I10 401.1       3. Family history of ischemic heart disease  Z82.49 V17.3       4. Gastroesophageal reflux disease, unspecified whether esophagitis present  K21.9 530.81       5. Mixed hyperlipidemia  E78.2 272.2       6. BMI 27.0-27.9,adult  Z68.27 V85.23           No orders of the defined types were placed in this encounter. 07/22/22    ECHO ADULT COMPLETE 07/22/2022 7/22/2022    Interpretation Summary    Left Ventricle: Normal left ventricular systolic function with a visually estimated EF of 60 - 65%. Left ventricle size is normal. Normal wall thickness. Normal wall motion. Normal diastolic function. Left Atrium: Left atrium is dilated. Signed by: Talia Potter MD on 7/22/2022  2:15 PM         Plan:     Precordial pain  LVEF 60-65% LAE valves ok [er echo 7/2022  No further cp    Benign essential HTN  Will increase amlodipine 5 mg BID  The patient will monitor BP at home and call if generally >140/85. Mixed hyperlipidemia  7/2021   8/2020   Labs and lipids per PCP    Gastroesophageal reflux disease, unspecified whether esophagitis present  On Famotidine    Patient was made aware during visit today that all testing completed would be instantaneously available on their MyChart for review. Discussed that these results will be made available to the provider at the same time. They were advised to wait at least 3 business days to allow for provider's interpretation of results with follow-up before calling our office with concerns about their results.     Continue current care and f/u in 1 mos        Aide Baugh NP

## 2022-12-14 ENCOUNTER — TRANSCRIBE ORDER (OUTPATIENT)
Dept: SCHEDULING | Age: 37
End: 2022-12-14

## 2022-12-14 DIAGNOSIS — N63.32 UNSPECIFIED LUMP IN AXILLARY TAIL OF THE LEFT BREAST: Primary | ICD-10-CM

## 2022-12-21 ENCOUNTER — OFFICE VISIT (OUTPATIENT)
Dept: CARDIOLOGY CLINIC | Age: 37
End: 2022-12-21
Payer: COMMERCIAL

## 2022-12-21 VITALS
DIASTOLIC BLOOD PRESSURE: 92 MMHG | HEART RATE: 97 BPM | OXYGEN SATURATION: 98 % | RESPIRATION RATE: 20 BRPM | SYSTOLIC BLOOD PRESSURE: 130 MMHG | WEIGHT: 174 LBS | HEIGHT: 66 IN | TEMPERATURE: 98.4 F | BODY MASS INDEX: 27.97 KG/M2

## 2022-12-21 DIAGNOSIS — Z82.49 FAMILY HISTORY OF ISCHEMIC HEART DISEASE: ICD-10-CM

## 2022-12-21 DIAGNOSIS — E78.2 MIXED HYPERLIPIDEMIA: ICD-10-CM

## 2022-12-21 DIAGNOSIS — I10 BENIGN ESSENTIAL HTN: ICD-10-CM

## 2022-12-21 DIAGNOSIS — K21.9 GASTROESOPHAGEAL REFLUX DISEASE, UNSPECIFIED WHETHER ESOPHAGITIS PRESENT: ICD-10-CM

## 2022-12-21 DIAGNOSIS — R07.2 PRECORDIAL PAIN: Primary | ICD-10-CM

## 2022-12-21 DIAGNOSIS — I10 ESSENTIAL HYPERTENSION: ICD-10-CM

## 2022-12-21 PROCEDURE — 3078F DIAST BP <80 MM HG: CPT | Performed by: NURSE PRACTITIONER

## 2022-12-21 PROCEDURE — 3074F SYST BP LT 130 MM HG: CPT | Performed by: NURSE PRACTITIONER

## 2022-12-21 PROCEDURE — 99214 OFFICE O/P EST MOD 30 MIN: CPT | Performed by: NURSE PRACTITIONER

## 2022-12-21 RX ORDER — AMLODIPINE BESYLATE 5 MG/1
5 TABLET ORAL 2 TIMES DAILY
Qty: 180 TABLET | Refills: 1 | Status: SHIPPED | OUTPATIENT
Start: 2022-12-21

## 2022-12-21 NOTE — PROGRESS NOTES
Identified pt with two pt identifiers(name and ). Reviewed record in preparation for visit and have obtained necessary documentation. Chief Complaint   Patient presents with    Hypertension     Follow up      Vitals:    22 1425   BP: (!) 120/90   Pulse: 97   Resp: 20   Temp: 98.4 °F (36.9 °C)   TempSrc: Temporal   SpO2: 98%   Weight: 174 lb (78.9 kg)   Height: 5' 6\" (1.676 m)   PainSc:   0 - No pain       Medications reviewed/approved by provider. Health Maintenance Review: Patient reminded of \"due or due soon\" health maintenance. I have asked the patient to contact his/her primary care provider (PCP) for follow-up on his/her health maintenance. Coordination of Care Questionnaire:  :   1) Have you been to an emergency room, urgent care, or hospitalized since your last visit? If yes, where when, and reason for visit? no       2. Have seen or consulted any other health care provider since your last visit? If yes, where when, and reason for visit? NO      Patient is accompanied by self I have received verbal consent from Lyubov Pradhan to discuss any/all medical information while they are present in the room.

## 2023-01-10 NOTE — PROGRESS NOTES
[address], [address]  [phone]  [facility], [address]     Subjective:      Mike Cervantes, [facility]    Jenniffer Frank is a 44 y.o. female is here for Follow up. Pmhx HTN, HLD, allergic rhinitis and migraine. Referred by PCP for cp. HPI 5/9/2022  She reports chronic fatigue. She feels sluggish. She stopped taking vitamin D but started again a few weeks ago. She goes to bed around 12-1 and wakes up at 6:30. She doesn't nap. She feels like she sleeps soundly. The patient has intermittent anterior chest wall pains for the past several years. EKG, CXR ok. Symptoms come and go. Little improvement with GERD medications. She is on amlodipine for HTN. Mom with hx of cardiomyopathy, CHF, passed away in October 2021    Seen by me in 6/2022  endorses same symptoms--fatigue, anterior nonexertional cp, no sob. Echo was fine,  stress test not done    Seen by me in 10/2022, reordered stress test as still c/o intermittent cp    Last seen by me in 12/2022:  Bp running high,  I increased her amlodipine to 5 mg BID  At that time she denied any further cp. She did the 5 K without exertional symptoms, did not pursue stress test    Today, bp well controlled at 122/78. She is tolerating the medication well. The patient denies shortness of breath, orthopnea, PND, LE edema, palpitations, syncope, presyncope or fatigue.          Patient Active Problem List    Diagnosis Date Noted    Essential hypertension 08/30/2021    Chronic allergic rhinitis 08/30/2021    Elevated liver enzymes 11/28/2018      Liliana Michaels NP  Past Medical History:   Diagnosis Date    Human papillomavirus in conditions classified elsewhere and of unspecified site 2006    Cervix    Menarche age 14    Migraine without aura, without mention of intractable migraine without mention of status migrainosus 2009      Past Surgical History:   Procedure Laterality Date    HX COLPOSCOPY  03/07/06    With Bx Allergies   Allergen Reactions    Amoxicillin Unknown (comments)      Family History   Problem Relation Age of Onset    Elevated Lipids Mother         elevated cholesterol    Hypertension Maternal Aunt       Social History     Socioeconomic History    Marital status: SINGLE     Spouse name: Not on file    Number of children: Not on file    Years of education: Not on file    Highest education level: Not on file   Occupational History    Not on file   Tobacco Use    Smoking status: Never    Smokeless tobacco: Never   Substance and Sexual Activity    Alcohol use: Yes    Drug use: No    Sexual activity: Yes     Birth control/protection: I.U.D. Other Topics Concern    Not on file   Social History Narrative    Not on file     Social Determinants of Health     Financial Resource Strain: Not on file   Food Insecurity: Not on file   Transportation Needs: Not on file   Physical Activity: Not on file   Stress: Not on file   Social Connections: Not on file   Intimate Partner Violence: Not on file   Housing Stability: Not on file      Current Outpatient Medications   Medication Sig    amLODIPine (NORVASC) 5 mg tablet Take 1 Tablet by mouth two (2) times a day. Indications: high blood pressure    cyclobenzaprine (FLEXERIL) 10 mg tablet Take 1 Tablet by mouth three (3) times daily as needed for Muscle Spasm(s). famotidine (PEPCID) 20 mg tablet Take 1 tablet by mouth twice daily (Patient taking differently: as needed.)    montelukast (SINGULAIR) 10 mg tablet TAKE 1 TABLET BY MOUTH DAILY (Patient taking differently: Take 10 mg by mouth as needed.)    valACYclovir (VALTREX) 1 gram tablet 2 tabs PO twice daily for 1 day PRN fever blisters (Patient taking differently: as needed. 2 tabs PO twice daily for 1 day PRN fever blisters)    Cetirizine 10 mg cap Take  by mouth as needed. fluticasone propionate (FLONASE NA) by Nasal route as needed.     levonorgestreL (MIRENA) 20 mcg/24 hours (8 yrs) 52 mg IUD 1 Each by IntraUTERine route once. No current facility-administered medications for this visit. Review of Symptoms:  11 systems reviewed, negative other than as stated in the HPI    Physical ExamPhysical Exam:    There were no vitals filed for this visit. There is no height or weight on file to calculate BMI. General PE   General:  Well developed, in no acute distress, cooperative and alert  HEENT: No carotid bruits, no JVD, trach is midline. Neck Supple, PEERL, EOM intact. Heart:  reg rate and rhythm; normal S1/S2; no murmurs, gallops or rubs. Respiratory: Clear bilaterally x 4, no wheezing or rales  Abdomen:   Soft, non-tender, no distention, no masses. + BS. Extremities:  Normal cap refill, no cyanosis, atraumatic. No edema. Neuro: A&Ox3, speech clear, gait stable. Skin: Skin color is normal. No rashes or lesions. Non diaphoretic  Vascular: 2+ pulses symmetric in all extremities    Labs:   Lab Results   Component Value Date/Time    Cholesterol, total 166 07/09/2021 11:26 AM    Cholesterol, total 169 08/14/2020 02:15 PM    HDL Cholesterol 56 07/09/2021 11:26 AM    HDL Cholesterol 46 08/14/2020 02:15 PM    LDL, calculated 99.8 07/09/2021 11:26 AM    LDL, calculated 111 (H) 08/14/2020 02:15 PM    Triglyceride 51 07/09/2021 11:26 AM    Triglyceride 62 08/14/2020 02:15 PM    CHOL/HDL Ratio 3.0 07/09/2021 11:26 AM     No results found for: CPK, CPKX, CPX  Lab Results   Component Value Date/Time    Sodium 140 05/09/2022 01:50 PM    Potassium 3.8 05/09/2022 01:50 PM    Chloride 109 (H) 05/09/2022 01:50 PM    CO2 22 05/09/2022 01:50 PM    Anion gap 9 05/09/2022 01:50 PM    Glucose 108 (H) 05/09/2022 01:50 PM    BUN 10 05/09/2022 01:50 PM    Creatinine 0.78 05/09/2022 01:50 PM    BUN/Creatinine ratio 13 05/09/2022 01:50 PM    GFR est AA >60 05/09/2022 01:50 PM    GFR est non-AA >60 05/09/2022 01:50 PM    Calcium 9.1 05/09/2022 01:50 PM    Bilirubin, total 0.3 05/09/2022 01:50 PM    Alk.  phosphatase 81 05/09/2022 01:50 PM    Protein, total 7.0 05/09/2022 01:50 PM    Albumin 3.7 05/09/2022 01:50 PM    Globulin 3.3 05/09/2022 01:50 PM    A-G Ratio 1.1 05/09/2022 01:50 PM    ALT (SGPT) 12 05/09/2022 01:50 PM       EKG:       Assessment:     Assessment:        ICD-10-CM ICD-9-CM    1. Precordial pain  R07.2 786.51       2. Gastroesophageal reflux disease, unspecified whether esophagitis present  K21.9 530.81       3. Benign essential HTN  I10 401.1       4. Mixed hyperlipidemia  E78.2 272.2       5. Family history of ischemic heart disease  Z82.49 V17.3       6. BMI 28.0-28.9,adult  Z68.28 V85.24           No orders of the defined types were placed in this encounter. 07/22/22    ECHO ADULT COMPLETE 07/22/2022 7/22/2022    Interpretation Summary    Left Ventricle: Normal left ventricular systolic function with a visually estimated EF of 60 - 65%. Left ventricle size is normal. Normal wall thickness. Normal wall motion. Normal diastolic function. Left Atrium: Left atrium is dilated. Signed by: Kenzie Layton MD on 7/22/2022  2:15 PM         Plan:     Precordial pain  LVEF 60-65% LAE valves ok [er echo 7/2022  No further cp    Benign essential HTN  Controlled since we increased her amlodipine 5 mg BID, refilled  The patient will monitor BP at home and call if generally >140/85. Mixed hyperlipidemia  7/2021   8/2020   Labs and lipids per PCP    Gastroesophageal reflux disease, unspecified whether esophagitis present  On Famotidine PRN    Patient was made aware during visit today that all testing completed would be instantaneously available on their MyChart for review. Discussed that these results will be made available to the provider at the same time. They were advised to wait at least 3 business days to allow for provider's interpretation of results with follow-up before calling our office with concerns about their results.     Continue current care and f/u in 1 yr        Sixto Ledbetter NP

## 2023-01-11 ENCOUNTER — HOSPITAL ENCOUNTER (OUTPATIENT)
Dept: ULTRASOUND IMAGING | Age: 38
Discharge: HOME | End: 2023-01-11
Attending: OBSTETRICS & GYNECOLOGY
Payer: COMMERCIAL

## 2023-01-11 ENCOUNTER — HOSPITAL ENCOUNTER (OUTPATIENT)
Dept: MAMMOGRAPHY | Age: 38
Discharge: HOME | End: 2023-01-11
Attending: OBSTETRICS & GYNECOLOGY
Payer: COMMERCIAL

## 2023-01-11 DIAGNOSIS — N63.32 UNSPECIFIED LUMP IN AXILLARY TAIL OF THE LEFT BREAST: ICD-10-CM

## 2023-01-11 PROCEDURE — 77066 DX MAMMO INCL CAD BI: CPT

## 2023-01-11 PROCEDURE — 76642 ULTRASOUND BREAST LIMITED: CPT

## 2023-01-16 ENCOUNTER — OFFICE VISIT (OUTPATIENT)
Dept: CARDIOLOGY CLINIC | Age: 38
End: 2023-01-16
Payer: COMMERCIAL

## 2023-01-16 VITALS
DIASTOLIC BLOOD PRESSURE: 78 MMHG | BODY MASS INDEX: 27.8 KG/M2 | RESPIRATION RATE: 18 BRPM | SYSTOLIC BLOOD PRESSURE: 122 MMHG | OXYGEN SATURATION: 98 % | WEIGHT: 173 LBS | TEMPERATURE: 98.2 F | HEART RATE: 80 BPM | HEIGHT: 66 IN

## 2023-01-16 DIAGNOSIS — I10 BENIGN ESSENTIAL HTN: ICD-10-CM

## 2023-01-16 DIAGNOSIS — Z82.49 FAMILY HISTORY OF ISCHEMIC HEART DISEASE: ICD-10-CM

## 2023-01-16 DIAGNOSIS — I10 ESSENTIAL HYPERTENSION: ICD-10-CM

## 2023-01-16 DIAGNOSIS — K21.9 GASTROESOPHAGEAL REFLUX DISEASE, UNSPECIFIED WHETHER ESOPHAGITIS PRESENT: ICD-10-CM

## 2023-01-16 DIAGNOSIS — E78.2 MIXED HYPERLIPIDEMIA: ICD-10-CM

## 2023-01-16 DIAGNOSIS — R07.2 PRECORDIAL PAIN: Primary | ICD-10-CM

## 2023-01-16 PROCEDURE — 3078F DIAST BP <80 MM HG: CPT | Performed by: NURSE PRACTITIONER

## 2023-01-16 PROCEDURE — 99214 OFFICE O/P EST MOD 30 MIN: CPT | Performed by: NURSE PRACTITIONER

## 2023-01-16 PROCEDURE — 3074F SYST BP LT 130 MM HG: CPT | Performed by: NURSE PRACTITIONER

## 2023-01-16 RX ORDER — AMLODIPINE BESYLATE 5 MG/1
5 TABLET ORAL 2 TIMES DAILY
Qty: 180 TABLET | Refills: 1 | Status: SHIPPED | OUTPATIENT
Start: 2023-01-16

## 2023-01-16 NOTE — PROGRESS NOTES
Identified pt with two pt identifiers(name and ). Reviewed record in preparation for visit and have obtained necessary documentation. Chief Complaint   Patient presents with    Hypertension     2-3 week follow up post adding amlodipine 5 mg BID (BP at that time - 130/92)      Vitals:    23 1357   BP: 122/78   Pulse: 80   Resp: 18   Temp: 98.2 °F (36.8 °C)   TempSrc: Temporal   SpO2: 98%   Weight: 173 lb (78.5 kg)   Height: 5' 6\" (1.676 m)   PainSc:   0 - No pain       Medications reviewed/approved by provider. Health Maintenance Review: Patient reminded of \"due or due soon\" health maintenance. I have asked the patient to contact his/her primary care provider (PCP) for follow-up on his/her health maintenance. Coordination of Care Questionnaire:  :   1) Have you been to an emergency room, urgent care, or hospitalized since your last visit? If yes, where when, and reason for visit? no       2. Have seen or consulted any other health care provider since your last visit? If yes, where when, and reason for visit? NO      Patient is accompanied by self I have received verbal consent from Marquise Smith to discuss any/all medical information while they are present in the room.

## 2023-02-01 DIAGNOSIS — K21.9 GASTROESOPHAGEAL REFLUX DISEASE: ICD-10-CM

## 2023-02-01 RX ORDER — FAMOTIDINE 20 MG/1
20 TABLET, FILM COATED ORAL AS NEEDED
Qty: 60 TABLET | Refills: 0 | Status: SHIPPED | OUTPATIENT
Start: 2023-02-01

## 2023-03-24 ENCOUNTER — OFFICE VISIT (OUTPATIENT)
Dept: FAMILY MEDICINE CLINIC | Age: 38
End: 2023-03-24

## 2023-03-24 VITALS
RESPIRATION RATE: 20 BRPM | DIASTOLIC BLOOD PRESSURE: 82 MMHG | BODY MASS INDEX: 28.73 KG/M2 | HEART RATE: 90 BPM | SYSTOLIC BLOOD PRESSURE: 128 MMHG | TEMPERATURE: 97.8 F | WEIGHT: 178.8 LBS | HEIGHT: 66 IN | OXYGEN SATURATION: 99 %

## 2023-03-24 DIAGNOSIS — Z00.00 WELLNESS EXAMINATION: Primary | ICD-10-CM

## 2023-03-24 DIAGNOSIS — K21.9 GASTROESOPHAGEAL REFLUX DISEASE, UNSPECIFIED WHETHER ESOPHAGITIS PRESENT: ICD-10-CM

## 2023-03-24 DIAGNOSIS — J30.1 NON-SEASONAL ALLERGIC RHINITIS DUE TO POLLEN: ICD-10-CM

## 2023-03-24 DIAGNOSIS — I10 ESSENTIAL HYPERTENSION: ICD-10-CM

## 2023-03-24 DIAGNOSIS — E55.9 VITAMIN D DEFICIENCY: ICD-10-CM

## 2023-03-24 LAB
25(OH)D3 SERPL-MCNC: 14.7 NG/ML (ref 30–100)
ALBUMIN SERPL-MCNC: 3.7 G/DL (ref 3.5–5)
ALBUMIN/GLOB SERPL: 1.1 (ref 1.1–2.2)
ALP SERPL-CCNC: 79 U/L (ref 45–117)
ALT SERPL-CCNC: 13 U/L (ref 12–78)
ANION GAP SERPL CALC-SCNC: 2 MMOL/L (ref 5–15)
AST SERPL-CCNC: 10 U/L (ref 15–37)
BASOPHILS # BLD: 0.1 K/UL (ref 0–0.1)
BASOPHILS NFR BLD: 1 % (ref 0–1)
BILIRUB SERPL-MCNC: 0.2 MG/DL (ref 0.2–1)
BUN SERPL-MCNC: 10 MG/DL (ref 6–20)
BUN/CREAT SERPL: 10 (ref 12–20)
CALCIUM SERPL-MCNC: 9.8 MG/DL (ref 8.5–10.1)
CHLORIDE SERPL-SCNC: 108 MMOL/L (ref 97–108)
CHOLEST SERPL-MCNC: 180 MG/DL
CO2 SERPL-SCNC: 27 MMOL/L (ref 21–32)
CREAT SERPL-MCNC: 0.97 MG/DL (ref 0.55–1.02)
DIFFERENTIAL METHOD BLD: NORMAL
EOSINOPHIL # BLD: 0.2 K/UL (ref 0–0.4)
EOSINOPHIL NFR BLD: 2 % (ref 0–7)
ERYTHROCYTE [DISTWIDTH] IN BLOOD BY AUTOMATED COUNT: 13.4 % (ref 11.5–14.5)
GLOBULIN SER CALC-MCNC: 3.3 G/DL (ref 2–4)
GLUCOSE SERPL-MCNC: 101 MG/DL (ref 65–100)
HCT VFR BLD AUTO: 39.3 % (ref 35–47)
HDLC SERPL-MCNC: 60 MG/DL
HDLC SERPL: 3 (ref 0–5)
HGB BLD-MCNC: 12.2 G/DL (ref 11.5–16)
IMM GRANULOCYTES # BLD AUTO: 0 K/UL (ref 0–0.04)
IMM GRANULOCYTES NFR BLD AUTO: 0 % (ref 0–0.5)
LDLC SERPL CALC-MCNC: 99.6 MG/DL (ref 0–100)
LYMPHOCYTES # BLD: 1.9 K/UL (ref 0.8–3.5)
LYMPHOCYTES NFR BLD: 21 % (ref 12–49)
MCH RBC QN AUTO: 27.5 PG (ref 26–34)
MCHC RBC AUTO-ENTMCNC: 31 G/DL (ref 30–36.5)
MCV RBC AUTO: 88.5 FL (ref 80–99)
MONOCYTES # BLD: 0.7 K/UL (ref 0–1)
MONOCYTES NFR BLD: 8 % (ref 5–13)
NEUTS SEG # BLD: 6 K/UL (ref 1.8–8)
NEUTS SEG NFR BLD: 68 % (ref 32–75)
NRBC # BLD: 0 K/UL (ref 0–0.01)
NRBC BLD-RTO: 0 PER 100 WBC
PLATELET # BLD AUTO: 256 K/UL (ref 150–400)
PMV BLD AUTO: 12.1 FL (ref 8.9–12.9)
POTASSIUM SERPL-SCNC: 3.8 MMOL/L (ref 3.5–5.1)
PROT SERPL-MCNC: 7 G/DL (ref 6.4–8.2)
RBC # BLD AUTO: 4.44 M/UL (ref 3.8–5.2)
SODIUM SERPL-SCNC: 137 MMOL/L (ref 136–145)
TRIGL SERPL-MCNC: 102 MG/DL (ref ?–150)
TSH SERPL DL<=0.05 MIU/L-ACNC: 1.04 UIU/ML (ref 0.36–3.74)
VLDLC SERPL CALC-MCNC: 20.4 MG/DL
WBC # BLD AUTO: 8.8 K/UL (ref 3.6–11)

## 2023-03-24 RX ORDER — METHYLPREDNISOLONE ACETATE 40 MG/ML
40 INJECTION, SUSPENSION INTRA-ARTICULAR; INTRALESIONAL; INTRAMUSCULAR; SOFT TISSUE ONCE
Status: COMPLETED | OUTPATIENT
Start: 2023-03-24 | End: 2023-03-24

## 2023-03-24 RX ORDER — DEXAMETHASONE SODIUM PHOSPHATE 4 MG/ML
4 INJECTION, SOLUTION INTRA-ARTICULAR; INTRALESIONAL; INTRAMUSCULAR; INTRAVENOUS; SOFT TISSUE ONCE
Status: COMPLETED | OUTPATIENT
Start: 2023-03-24 | End: 2023-03-24

## 2023-03-24 RX ADMIN — DEXAMETHASONE SODIUM PHOSPHATE 4 MG: 4 INJECTION, SOLUTION INTRA-ARTICULAR; INTRALESIONAL; INTRAMUSCULAR; INTRAVENOUS; SOFT TISSUE at 17:15

## 2023-03-24 RX ADMIN — METHYLPREDNISOLONE ACETATE 40 MG: 40 INJECTION, SUSPENSION INTRA-ARTICULAR; INTRALESIONAL; INTRAMUSCULAR; SOFT TISSUE at 17:17

## 2023-03-24 NOTE — PROGRESS NOTES
Chief Complaint   Patient presents with    Allergies    Blood Pressure Check    Labs     If needed       HPI:    Charity Vang is a 43 y.o. female in today for her check up. GERD: flaring lately, using famotidine 20 mg as needed. She reports food triggers, recently had GERD after a hamburger. She continues with intermittent anterior chest wall pains for the past several years. EKG, CXR ok. Symptoms come and go. She follows with cardiology. HTN: She is on amlodipine BID for HTN. Allergies: Using antihistamines, nasal spray without improvement. Allergies   Allergen Reactions    Amoxicillin Unknown (comments)       Current Outpatient Medications   Medication Sig    famotidine (PEPCID) 20 mg tablet Take 1 Tablet by mouth as needed for Gastroesophageal Reflux Disease (GERD). amLODIPine (NORVASC) 5 mg tablet Take 1 Tablet by mouth two (2) times a day. Indications: high blood pressure    cyclobenzaprine (FLEXERIL) 10 mg tablet Take 1 Tablet by mouth three (3) times daily as needed for Muscle Spasm(s). montelukast (SINGULAIR) 10 mg tablet TAKE 1 TABLET BY MOUTH DAILY (Patient taking differently: Take 10 mg by mouth as needed.)    valACYclovir (VALTREX) 1 gram tablet 2 tabs PO twice daily for 1 day PRN fever blisters (Patient taking differently: as needed. 2 tabs PO twice daily for 1 day PRN fever blisters)    Cetirizine 10 mg cap Take  by mouth as needed. fluticasone propionate (FLONASE NA) by Nasal route as needed. levonorgestreL (MIRENA) 20 mcg/24 hours (8 yrs) 52 mg IUD 1 Each by IntraUTERine route once.      Current Facility-Administered Medications   Medication    dexamethasone (DECADRON) 4 mg/mL injection 4 mg    methylPREDNISolone acetate (DEPO-MEDROL) 40 mg/mL injection 40 mg       Past Medical History:   Diagnosis Date    Human papillomavirus in conditions classified elsewhere and of unspecified site 2006    Cervix    Menarche age 14    Migraine without aura, without mention of intractable migraine without mention of status migrainosus 2009       Family History   Problem Relation Age of Onset    Elevated Lipids Mother         elevated cholesterol    Hypertension Maternal Aunt        ROS:  Denies fever, chills, cough, chest pain, SOB,  nausea, vomiting, diarrhea, dysuria. Denies rashes, wounds, + arthralgias, weakness, numbness, visual changes, depression. Denies wt loss, wt gain, hemoptysis, hematochezia or melena. Patient is not experiencing chest pain radiating to the jaw and/or down the arms. Physical Examination:    /82 (BP 1 Location: Left arm, BP Patient Position: Sitting, BP Cuff Size: Adult)   Pulse 90   Temp 97.8 °F (36.6 °C) (Temporal)   Resp 20   Ht 5' 6\" (1.676 m)   Wt 178 lb 12.8 oz (81.1 kg)   SpO2 99%   BMI 28.86 kg/m²     Wt Readings from Last 3 Encounters:   03/24/23 178 lb 12.8 oz (81.1 kg)   01/16/23 173 lb (78.5 kg)   12/21/22 174 lb (78.9 kg)     Constitutional: WDWN Female in no acute distress  HENT:  NC/AT, TM pearly gray, OP: deferred, + mask  EYES: EOMI, PERRL  Neck:  Supple, no JVD, mass or bruit. No thyromegaly. Respiratory:  Respirations even and unlabored without use of accessory muscles, CTA throughout without wheezes, rales, rubs or rhonchi. Symmetrical chest expansion. Cardiac: RRR no clicks, murmurs, gallops, or rubs  Musculoskeletal:  No cyanosis, clubbing or edema of extremities. Moves all extremities without difficulty. Neurologic:  Smooth, even gait without assistance, CN 2-12 grossly intact. Skin: intact and warm to the touch, no rash   Lymphadenopathy: no cervical or supraclavicular nodes  Psych: Pleasant and appropriate. Judgment normal. Alert and oriented x 3. ASSESSMENT AND PLAN:       ICD-10-CM ICD-9-CM    1.  Wellness examination  Z00.00 V70.0 UT COLLECTION VENOUS BLOOD VENIPUNCTURE      CBC WITH AUTOMATED DIFF      LIPID PANEL      METABOLIC PANEL, COMPREHENSIVE      TSH 3RD GENERATION      VITAMIN D, 25 HYDROXY dexamethasone (DECADRON) 4 mg/mL injection 4 mg      methylPREDNISolone acetate (DEPO-MEDROL) 40 mg/mL injection 40 mg      VITAMIN D, 25 HYDROXY      TSH 3RD GENERATION      METABOLIC PANEL, COMPREHENSIVE      LIPID PANEL      CBC WITH AUTOMATED DIFF      NJ COLLECTION VENOUS BLOOD VENIPUNCTURE      2. Non-seasonal allergic rhinitis due to pollen  J30.1 477.0 dexamethasone (DECADRON) 4 mg/mL injection 4 mg      methylPREDNISolone acetate (DEPO-MEDROL) 40 mg/mL injection 40 mg      3. Essential hypertension  I10 401.9 [place] COLLECTION VENOUS BLOOD VENIPUNCTURE      CBC WITH AUTOMATED DIFF      LIPID PANEL      METABOLIC PANEL, COMPREHENSIVE      TSH 3RD GENERATION      TSH 3RD GENERATION      METABOLIC PANEL, COMPREHENSIVE      LIPID PANEL      CBC WITH AUTOMATED DIFF      NJ COLLECTION VENOUS BLOOD VENIPUNCTURE      4. Vitamin D deficiency  E55.9 268.9 [place] COLLECTION VENOUS BLOOD VENIPUNCTURE      VITAMIN D, 25 HYDROXY      VITAMIN D, 25 HYDROXY      NJ COLLECTION VENOUS BLOOD VENIPUNCTURE        Check up labs today. Steroid injection for allergy symptoms. BP excellent, continue same. Start famotidine daily. If no improvement, increase to BID. If symptoms persist, may need a PPI. She can follow up with me via MyChart. Medication Side Effects and Warnings were discussed with patient:  yes  Patient Labs were reviewed:  yes  Patient Past Records were reviewed:  yes    Patient aware of plan of care and verbalized understanding. Questions answered. RTC PRN.     Bam Arnett NP

## 2023-03-24 NOTE — PROGRESS NOTES
Chief Complaint   Patient presents with    Allergies    Blood Pressure Check    Labs     If needed     YES Answers must have Comments  1. \"Have you been to the ER, urgent care clinic since your last visit? Hospitalized since your last visit? \"    [] YES   [x] NO       2. Have you seen or consulted any other health care providers outside of [address] [place] [address] since your last visit?     [] YES   [x] NO       3. For patients aged 39-70: Have you had a colorectal cancer screening such as a colonoscopy/FIT/Cologuard? Nurse/CMA to request records if not in chart   [] YES   [] NO   [x] NA, based on age    If the patient is female:      4. For female patients aged 41-77: Hannahmalu Guardado you had a mammogram in the last two years?  Nurse/CMA to request records if not in chart   [x] YES When Jan / Feb 2023 and Where [place]  [] NO   [] NA, based on age    11. For female patients aged 21-65: Hannahmalu Guardado you had a pap smear?   Nurse/CMA to request records if not in chart   [] YES When 2023 and Where [facility]  [] NO  [] NA, based on age

## 2023-05-15 ENCOUNTER — APPOINTMENT (OUTPATIENT)
Facility: HOSPITAL | Age: 38
End: 2023-05-15
Payer: COMMERCIAL

## 2023-05-15 ENCOUNTER — HOSPITAL ENCOUNTER (EMERGENCY)
Facility: HOSPITAL | Age: 38
Discharge: HOME | End: 2023-05-15
Attending: FAMILY MEDICINE | Admitting: FAMILY MEDICINE
Payer: COMMERCIAL

## 2023-05-15 DIAGNOSIS — M79.605 LEFT LEG PAIN: Primary | ICD-10-CM

## 2023-05-15 DIAGNOSIS — T14.8XXA MUSCLE STRAIN: ICD-10-CM

## 2023-05-15 LAB
ALBUMIN SERPL-MCNC: 3.9 G/DL (ref 3.5–5)
ALBUMIN/GLOB SERPL: 1.1 (ref 1.1–2.2)
ALP SERPL-CCNC: 94 U/L (ref 45–117)
ALT SERPL-CCNC: 16 U/L (ref 12–78)
ANION GAP SERPL CALC-SCNC: 11 MMOL/L (ref 5–15)
AST SERPL-CCNC: 14 U/L (ref 15–37)
BASOPHILS # BLD: 0.1 K/UL (ref 0–0.1)
BASOPHILS NFR BLD: 1 % (ref 0–1)
BILIRUB SERPL-MCNC: 0.4 MG/DL (ref 0.2–1)
BUN SERPL-MCNC: 8 MG/DL (ref 6–20)
BUN/CREAT SERPL: 9 (ref 12–20)
CALCIUM SERPL-MCNC: 9.6 MG/DL (ref 8.5–10.1)
CHLORIDE SERPL-SCNC: 102 MMOL/L (ref 97–108)
CO2 SERPL-SCNC: 26 MMOL/L (ref 21–32)
CREAT SERPL-MCNC: 0.87 MG/DL (ref 0.55–1.02)
D DIMER PPP FEU-MCNC: 0.46 MG/L FEU (ref 0–0.65)
DIFFERENTIAL METHOD BLD: NORMAL
EOSINOPHIL # BLD: 0.1 K/UL (ref 0–0.4)
EOSINOPHIL NFR BLD: 1 % (ref 0–7)
ERYTHROCYTE [DISTWIDTH] IN BLOOD BY AUTOMATED COUNT: 13.1 % (ref 11.5–14.5)
GLOBULIN SER CALC-MCNC: 3.6 G/DL (ref 2–4)
GLUCOSE SERPL-MCNC: 96 MG/DL (ref 65–100)
HCT VFR BLD AUTO: 37.3 % (ref 35–47)
HGB BLD-MCNC: 12.4 G/DL (ref 11.5–16)
IMM GRANULOCYTES # BLD AUTO: 0 K/UL (ref 0–0.04)
IMM GRANULOCYTES NFR BLD AUTO: 0 % (ref 0–0.5)
LYMPHOCYTES # BLD: 1.7 K/UL (ref 0.8–3.5)
LYMPHOCYTES NFR BLD: 20 % (ref 12–49)
MCH RBC QN AUTO: 28.3 PG (ref 26–34)
MCHC RBC AUTO-ENTMCNC: 33.2 G/DL (ref 30–36.5)
MCV RBC AUTO: 85.2 FL (ref 80–99)
MONOCYTES # BLD: 0.6 K/UL (ref 0–1)
MONOCYTES NFR BLD: 7 % (ref 5–13)
NEUTS SEG # BLD: 5.9 K/UL (ref 1.8–8)
NEUTS SEG NFR BLD: 71 % (ref 32–75)
NRBC # BLD: 0 K/UL (ref 0–0.01)
NRBC BLD-RTO: 0 PER 100 WBC
PLATELET # BLD AUTO: 272 K/UL (ref 150–400)
PMV BLD AUTO: 11.6 FL (ref 8.9–12.9)
POTASSIUM SERPL-SCNC: 3.8 MMOL/L (ref 3.5–5.1)
PROT SERPL-MCNC: 7.5 G/DL (ref 6.4–8.2)
RBC # BLD AUTO: 4.38 M/UL (ref 3.8–5.2)
SODIUM SERPL-SCNC: 139 MMOL/L (ref 136–145)
WBC # BLD AUTO: 8.4 K/UL (ref 3.6–11)

## 2023-05-15 PROCEDURE — 6370000000 HC RX 637 (ALT 250 FOR IP): Performed by: FAMILY MEDICINE

## 2023-05-15 PROCEDURE — 73552 X-RAY EXAM OF FEMUR 2/>: CPT

## 2023-05-15 PROCEDURE — 80053 COMPREHEN METABOLIC PANEL: CPT

## 2023-05-15 PROCEDURE — 99284 EMERGENCY DEPT VISIT MOD MDM: CPT

## 2023-05-15 PROCEDURE — 36415 COLL VENOUS BLD VENIPUNCTURE: CPT

## 2023-05-15 PROCEDURE — 85025 COMPLETE CBC W/AUTO DIFF WBC: CPT

## 2023-05-15 PROCEDURE — 85379 FIBRIN DEGRADATION QUANT: CPT

## 2023-05-15 RX ORDER — CYCLOBENZAPRINE HCL 10 MG
10 TABLET ORAL 3 TIMES DAILY PRN
Qty: 21 TABLET | Refills: 0 | Status: SHIPPED | OUTPATIENT
Start: 2023-05-15 | End: 2023-05-25

## 2023-05-15 RX ORDER — CYCLOBENZAPRINE HCL 10 MG
10 TABLET ORAL 3 TIMES DAILY PRN
Qty: 20 TABLET | Refills: 0 | Status: SHIPPED | OUTPATIENT
Start: 2023-05-15

## 2023-05-15 RX ORDER — NAPROXEN 500 MG/1
500 TABLET ORAL 2 TIMES DAILY PRN
Qty: 60 TABLET | Refills: 0 | Status: SHIPPED | OUTPATIENT
Start: 2023-05-15

## 2023-05-15 RX ORDER — CYCLOBENZAPRINE HCL 10 MG
10 TABLET ORAL
Status: COMPLETED | OUTPATIENT
Start: 2023-05-15 | End: 2023-05-15

## 2023-05-15 RX ADMIN — CYCLOBENZAPRINE 10 MG: 10 TABLET, FILM COATED ORAL at 22:18

## 2023-05-15 ASSESSMENT — PAIN - FUNCTIONAL ASSESSMENT
PAIN_FUNCTIONAL_ASSESSMENT: ACTIVITIES ARE NOT PREVENTED
PAIN_FUNCTIONAL_ASSESSMENT: ACTIVITIES ARE NOT PREVENTED
PAIN_FUNCTIONAL_ASSESSMENT: 0-10

## 2023-05-15 ASSESSMENT — PAIN SCALES - GENERAL
PAINLEVEL_OUTOF10: 6
PAINLEVEL_OUTOF10: 6
PAINLEVEL_OUTOF10: 8

## 2023-05-15 ASSESSMENT — PAIN DESCRIPTION - PAIN TYPE
TYPE: ACUTE PAIN
TYPE: ACUTE PAIN

## 2023-05-15 ASSESSMENT — PAIN DESCRIPTION - DESCRIPTORS
DESCRIPTORS: OTHER (COMMENT)
DESCRIPTORS: OTHER (COMMENT)
DESCRIPTORS: ACHING;OTHER (COMMENT)

## 2023-05-15 ASSESSMENT — LIFESTYLE VARIABLES
HOW MANY STANDARD DRINKS CONTAINING ALCOHOL DO YOU HAVE ON A TYPICAL DAY: PATIENT DOES NOT DRINK
HOW OFTEN DO YOU HAVE A DRINK CONTAINING ALCOHOL: NEVER

## 2023-05-15 ASSESSMENT — PAIN DESCRIPTION - ORIENTATION
ORIENTATION: LEFT

## 2023-05-15 ASSESSMENT — PAIN DESCRIPTION - ONSET
ONSET: ON-GOING
ONSET: ON-GOING

## 2023-05-15 ASSESSMENT — PAIN DESCRIPTION - LOCATION
LOCATION: LEG
LOCATION: LEG

## 2023-05-15 ASSESSMENT — PAIN DESCRIPTION - FREQUENCY
FREQUENCY: CONTINUOUS
FREQUENCY: CONTINUOUS

## 2023-05-15 NOTE — ED TRIAGE NOTES
Pt reports left thigh pain x 2 days without fall or injury. Took Aleve 1 tab at 1830 with no relief per pt.

## 2023-05-16 VITALS
DIASTOLIC BLOOD PRESSURE: 72 MMHG | SYSTOLIC BLOOD PRESSURE: 135 MMHG | HEART RATE: 68 BPM | BODY MASS INDEX: 27.8 KG/M2 | OXYGEN SATURATION: 98 % | HEIGHT: 66 IN | RESPIRATION RATE: 16 BRPM | TEMPERATURE: 99.3 F | WEIGHT: 173 LBS

## 2023-05-16 NOTE — DISCHARGE INSTRUCTIONS
--Naproxen 500 mg twice daily as needed for pain. Take with food. --Flexeril (Cyclobenzaprine) 10 mg every 8 hours as needed for muscle strain.

## 2023-05-16 NOTE — ED NOTES
Received assignment. Pt has atraumatic thigh pain, no injury, normal vitals. To have labs drawn when this RN is available.      Ceci King RN  23/42/87 2017

## 2023-05-17 NOTE — ED PROVIDER NOTES
[facility] EMERGENCY DEP  EMERGENCY DEPARTMENT ENCOUNTER       Pt Name: Tiago Kenney  MRN: [id number]  [place] 1985  Date of evaluation: 5/15/2023  Provider: Westley Tucker MD   PCP: Leita Brunner, APRN - NP  Note Started: 11:57 PM 5/16/23     CHIEF COMPLAINT       Chief Complaint   Patient presents with    Leg Pain        HISTORY OF PRESENT ILLNESS: 1 or more elements      History From: Patient  None     Tiago Kenney is a 43 y.o. female who presents to the ED with pain in the back of her left thigh that she has had for the last 2 days. She actually began to have pain in the groin of her left leg, and then it seemed to move around to the posterior thigh. She does have pain in her low back, but that was several days ago, and she does not really have the back pain with the low back pain. She describes it as a \"pulling pain,\" worse when she walks or moves around. She can think of nothing that she might have done to strain the leg or her back, no heavy lifting or repetitive actions, or falls. Nursing Notes were all reviewed and agreed with or any disagreements were addressed in the HPI. REVIEW OF SYSTEMS      Review of Systems     Positives and Pertinent negatives as per HPI.     PAST HISTORY     Past Medical History:  Past Medical History:   Diagnosis Date    Human papillomavirus in conditions classified elsewhere and of unspecified site 2006    Cervix    Menarche age 14    Migraine without aura, without mention of intractable migraine without mention of status migrainosus 2009         Past Surgical History:  Past Surgical History:   Procedure Laterality Date    COLPOSCOPY  03/07/06    With Bx       Family History:  Family History   Problem Relation Age of Onset    Elevated Lipids Mother         elevated cholesterol    Hypertension Maternal Aunt        Social History:  Social History     Tobacco Use    Smoking status: Never     Passive exposure: Never    Smokeless tobacco: Never   Vaping Use

## 2023-07-14 RX ORDER — METRONIDAZOLE 7.5 MG/G
GEL VAGINAL
Qty: 70 G | Refills: 1 | Status: SHIPPED | OUTPATIENT
Start: 2023-07-14 | End: 2023-07-18

## 2023-07-18 DIAGNOSIS — N76.0 ACUTE VAGINITIS: ICD-10-CM

## 2023-07-31 DIAGNOSIS — I10 ESSENTIAL (PRIMARY) HYPERTENSION: ICD-10-CM

## 2023-07-31 RX ORDER — AMLODIPINE BESYLATE 5 MG/1
TABLET ORAL
Qty: 180 TABLET | Refills: 1 | Status: SHIPPED | OUTPATIENT
Start: 2023-07-31

## 2023-08-10 RX ORDER — FLUCONAZOLE 150 MG/1
TABLET ORAL
Qty: 1 TABLET | Refills: 2 | Status: SHIPPED | OUTPATIENT
Start: 2023-08-10

## 2023-08-17 ENCOUNTER — TELEPHONE (OUTPATIENT)
Age: 38
End: 2023-08-17

## 2023-08-17 NOTE — TELEPHONE ENCOUNTER
Pt called with a medication question, Took the fluconazole on Monday as directed, then started the vaginal cream on Tuesday, and Wednesday. Her question is since she is still having the yeast symptoms, should she get the refills of fluconazole and restart along with continuing  the vaginal cream up to the 5 days? Will check with Michelle Rosen since she prescribed the fluconazole medication.

## 2023-08-17 NOTE — TELEPHONE ENCOUNTER
SW Ms Javier Haw to inform after realizing Andreea Kirk did the Rx for the fluconazole, in the office today, sent her the note. Per Andreea Kirk, she can do the fluconazole again on Monday, but continue with the vaginal cream the 5 days. Pt, OK of understanding, but still wants Bertha Gusman to review the note on tomorrow.

## 2023-08-18 NOTE — TELEPHONE ENCOUNTER
I agree with Deny Tamayo. If symptoms continue, she needs to come in and get an exam or see her GYN.

## 2023-08-22 NOTE — TELEPHONE ENCOUNTER
PC SW pt advised of Caridad's advice, OK of understanding and thanks. She did realize that she had skip a week in  between, got the refill and is doing better now.

## 2023-08-29 ENCOUNTER — OFFICE VISIT (OUTPATIENT)
Age: 38
End: 2023-08-29
Payer: COMMERCIAL

## 2023-08-29 VITALS
SYSTOLIC BLOOD PRESSURE: 128 MMHG | DIASTOLIC BLOOD PRESSURE: 70 MMHG | TEMPERATURE: 98.6 F | RESPIRATION RATE: 20 BRPM | OXYGEN SATURATION: 99 % | HEIGHT: 66 IN | WEIGHT: 182.6 LBS | HEART RATE: 86 BPM | BODY MASS INDEX: 29.35 KG/M2

## 2023-08-29 DIAGNOSIS — B37.31 VULVOVAGINAL CANDIDIASIS: Primary | ICD-10-CM

## 2023-08-29 PROCEDURE — 3078F DIAST BP <80 MM HG: CPT | Performed by: NURSE PRACTITIONER

## 2023-08-29 PROCEDURE — 99214 OFFICE O/P EST MOD 30 MIN: CPT | Performed by: NURSE PRACTITIONER

## 2023-08-29 PROCEDURE — 3074F SYST BP LT 130 MM HG: CPT | Performed by: NURSE PRACTITIONER

## 2023-08-29 RX ORDER — FLUCONAZOLE 100 MG/1
100 TABLET ORAL DAILY
Qty: 7 TABLET | Refills: 0 | Status: SHIPPED | OUTPATIENT
Start: 2023-08-29 | End: 2023-09-05

## 2023-08-29 ASSESSMENT — PATIENT HEALTH QUESTIONNAIRE - PHQ9
SUM OF ALL RESPONSES TO PHQ QUESTIONS 1-9: 0
2. FEELING DOWN, DEPRESSED OR HOPELESS: 0
1. LITTLE INTEREST OR PLEASURE IN DOING THINGS: 0
SUM OF ALL RESPONSES TO PHQ9 QUESTIONS 1 & 2: 0

## 2023-08-29 NOTE — PROGRESS NOTES
Chief Complaint   Patient presents with    Vaginitis       ASSESSMENT AND PLAN:      Diagnosis Orders   1. Vulvovaginal candidiasis  fluconazole (DIFLUCAN) 100 MG tablet    miconazole (MONISTAT 7 SIMPLY CURE) 2 % vaginal cream    Nuswab Vaginitis with Candida (Six Species)    Nuswab Vaginitis with Candida (Six Species)        Exam consistent with yeast, tx as above. Swab sent out. Due to recurrent infections, I'd like her to come back in 3 weeks for a re-check. Patient aware of plan of care and verbalized understanding. Questions answered. RTC 3 weeks, or sooner if needed. HPI:    Yancy Pagan is a 43 y.o. female in today for an acute visit. GERD: Using famotidine 20 mg. Symptoms come and go. She follows with cardiology. HTN: She is on amlodipine BID for HTN. Allergies: Using antihistamines, nasal spray. New issues: In today with a CC of foul vaginal odor x 1 week. She was treated with fluconazole and vaginal metronidazole for suspected BV vs yeast. Hx recurrent BV. Symptoms improved and then returned. Allergies   Allergen Reactions    Amoxicillin Hives     Other reaction(s): Unknown (comments)       Prior to Visit Medications    Medication Sig Taking? Authorizing Provider   fluconazole (DIFLUCAN) 100 MG tablet Take 1 tablet by mouth daily for 7 days Yes RACHELL Schaffer - NP   miconazole (MONISTAT 7 SIMPLY CURE) 2 % vaginal cream Place vaginally nightly.  Yes Daquan Jiang APRN - NP   amLODIPine (NORVASC) 5 MG tablet TAKE 1 TABLET BY MOUTH TWICE DAILY Yes RACHELL York - NP   VANDAZOLE 0.75 % vaginal gel INSERT ONE APPLICATORFUL INTO VAGINA NIGHTLY FOR 5 DAYS Yes Daquan Jiang APRN - NP   naproxen (NAPROSYN) 500 MG tablet Take 1 tablet by mouth 2 times daily as needed for Pain Yes Kayla Alicea MD   levonorgestrel [facility]) IUD 52 mg 1 each by IntraUTERine route once Yes Ar Automatic Reconciliation       Past Medical History:   Diagnosis Date    Human

## 2023-09-02 LAB
A VAGINAE DNA VAG QL NAA+PROBE: ABNORMAL SCORE
BACTERIAL VAGINOSIS, NAA: ABNORMAL
BVAB2 DNA VAG QL NAA+PROBE: ABNORMAL SCORE
C ALBICANS DNA VAG QL NAA+PROBE: NEGATIVE
C GLABRATA DNA VAG QL NAA+PROBE: NEGATIVE
CANDIDA KRUSEI: NEGATIVE
CANDIDA LUSITANIAE, NAA, 180015: NEGATIVE
CANDIDA PARAPSILOSIS/TROPICALIS: NEGATIVE
MEGA1 DNA VAG QL NAA+PROBE: ABNORMAL SCORE
SPECIMEN SOURCE: ABNORMAL
T VAGINALIS RRNA SPEC QL NAA+PROBE: NEGATIVE

## 2023-09-26 ENCOUNTER — PATIENT MESSAGE (OUTPATIENT)
Age: 38
End: 2023-09-26

## 2023-09-26 ENCOUNTER — OFFICE VISIT (OUTPATIENT)
Age: 38
End: 2023-09-26
Payer: COMMERCIAL

## 2023-09-26 VITALS
RESPIRATION RATE: 20 BRPM | WEIGHT: 182.4 LBS | BODY MASS INDEX: 29.32 KG/M2 | TEMPERATURE: 98.9 F | OXYGEN SATURATION: 99 % | DIASTOLIC BLOOD PRESSURE: 82 MMHG | HEIGHT: 66 IN | SYSTOLIC BLOOD PRESSURE: 122 MMHG | HEART RATE: 85 BPM

## 2023-09-26 DIAGNOSIS — J30.9 ALLERGIC RHINITIS, UNSPECIFIED SEASONALITY, UNSPECIFIED TRIGGER: ICD-10-CM

## 2023-09-26 DIAGNOSIS — N76.0 ACUTE VAGINITIS: Primary | ICD-10-CM

## 2023-09-26 PROCEDURE — 3079F DIAST BP 80-89 MM HG: CPT | Performed by: NURSE PRACTITIONER

## 2023-09-26 PROCEDURE — 3074F SYST BP LT 130 MM HG: CPT | Performed by: NURSE PRACTITIONER

## 2023-09-26 PROCEDURE — 99214 OFFICE O/P EST MOD 30 MIN: CPT | Performed by: NURSE PRACTITIONER

## 2023-09-26 PROCEDURE — 96372 THER/PROPH/DIAG INJ SC/IM: CPT | Performed by: NURSE PRACTITIONER

## 2023-09-26 RX ORDER — METHYLPREDNISOLONE 4 MG/1
TABLET ORAL
Qty: 1 KIT | Refills: 0 | Status: SHIPPED | OUTPATIENT
Start: 2023-09-26 | End: 2023-10-02

## 2023-09-26 RX ORDER — METHYLPREDNISOLONE ACETATE 40 MG/ML
40 INJECTION, SUSPENSION INTRA-ARTICULAR; INTRALESIONAL; INTRAMUSCULAR; SOFT TISSUE ONCE
Status: COMPLETED | OUTPATIENT
Start: 2023-09-26 | End: 2023-09-26

## 2023-09-26 RX ORDER — DEXAMETHASONE SODIUM PHOSPHATE 4 MG/ML
4 INJECTION, SOLUTION INTRA-ARTICULAR; INTRALESIONAL; INTRAMUSCULAR; INTRAVENOUS; SOFT TISSUE ONCE
Status: COMPLETED | OUTPATIENT
Start: 2023-09-26 | End: 2023-09-26

## 2023-09-26 RX ADMIN — METHYLPREDNISOLONE ACETATE 40 MG: 40 INJECTION, SUSPENSION INTRA-ARTICULAR; INTRALESIONAL; INTRAMUSCULAR; SOFT TISSUE at 10:31

## 2023-09-26 RX ADMIN — DEXAMETHASONE SODIUM PHOSPHATE 4 MG: 4 INJECTION, SOLUTION INTRA-ARTICULAR; INTRALESIONAL; INTRAMUSCULAR; INTRAVENOUS; SOFT TISSUE at 10:28

## 2023-09-26 SDOH — ECONOMIC STABILITY: TRANSPORTATION INSECURITY
IN THE PAST 12 MONTHS, HAS LACK OF TRANSPORTATION KEPT YOU FROM MEETINGS, WORK, OR FROM GETTING THINGS NEEDED FOR DAILY LIVING?: NO

## 2023-09-26 SDOH — ECONOMIC STABILITY: FOOD INSECURITY: WITHIN THE PAST 12 MONTHS, THE FOOD YOU BOUGHT JUST DIDN'T LAST AND YOU DIDN'T HAVE MONEY TO GET MORE.: PATIENT DECLINED

## 2023-09-26 SDOH — ECONOMIC STABILITY: FOOD INSECURITY: WITHIN THE PAST 12 MONTHS, YOU WORRIED THAT YOUR FOOD WOULD RUN OUT BEFORE YOU GOT MONEY TO BUY MORE.: SOMETIMES TRUE

## 2023-09-26 SDOH — ECONOMIC STABILITY: INCOME INSECURITY: HOW HARD IS IT FOR YOU TO PAY FOR THE VERY BASICS LIKE FOOD, HOUSING, MEDICAL CARE, AND HEATING?: SOMEWHAT HARD

## 2023-09-26 SDOH — ECONOMIC STABILITY: HOUSING INSECURITY
IN THE LAST 12 MONTHS, WAS THERE A TIME WHEN YOU DID NOT HAVE A STEADY PLACE TO SLEEP OR SLEPT IN A SHELTER (INCLUDING NOW)?: NO

## 2023-09-26 ASSESSMENT — PATIENT HEALTH QUESTIONNAIRE - PHQ9
SUM OF ALL RESPONSES TO PHQ QUESTIONS 1-9: 0
SUM OF ALL RESPONSES TO PHQ9 QUESTIONS 1 & 2: 0
SUM OF ALL RESPONSES TO PHQ QUESTIONS 1-9: 0
1. LITTLE INTEREST OR PLEASURE IN DOING THINGS: 0
SUM OF ALL RESPONSES TO PHQ QUESTIONS 1-9: 0
SUM OF ALL RESPONSES TO PHQ QUESTIONS 1-9: 0
2. FEELING DOWN, DEPRESSED OR HOPELESS: 0

## 2023-09-26 NOTE — PROGRESS NOTES
Chief Complaint   Patient presents with    Follow-up     2 weeks    Allergies     Runny nose, throat drainage, sneezing        ASSESSMENT AND PLAN:      Diagnosis Orders   1. Acute vaginitis  Nuswab Vaginitis Plus (VG+)      2. Allergic rhinitis, unspecified seasonality, unspecified trigger  methylPREDNISolone acetate (DEPO-MEDROL) injection 40 mg    dexamethasone (DECADRON) injection 4 mg    methylPREDNISolone (MEDROL DOSEPACK) 4 MG tablet        Steroid injection in the office. Switch Zyrtec to an alternative antihistamine like Xyzal. Rx Medrol dose pack if symptoms persist. Vaginal exam WNL, sample obtained. Patient aware of plan of care and verbalized understanding. Questions answered. RTC PRN. HPI:    Jesica Murdock is a 43 y.o. female in today for a follow up visit. GERD: Using famotidine 20 mg. Symptoms come and go. She follows with cardiology. HTN: She is on amlodipine BID for HTN. Allergies: Using antihistamines, nasal spray. New issues: Shyam Hudson has been treated multiple times for recurrent BV and candidal vaginitis. She was seen three weeks ago, tx empirically with fluconazole and swabbed. Testing was negative for yeast and indeterminate for BV. In today for repeat testing. She also reports allergies over the past few days, little relief with home allergy medications. Allergies   Allergen Reactions    Amoxicillin Hives     Other reaction(s): Unknown (comments)       Prior to Visit Medications    Medication Sig Taking? Authorizing Provider   methylPREDNISolone (MEDROL DOSEPACK) 4 MG tablet Take by mouth.  Yes Marian Eugene APRN - NP   amLODIPine (NORVASC) 5 MG tablet TAKE 1 TABLET BY MOUTH TWICE DAILY Yes Aroldo Quintanilla APRN - NP   naproxen (NAPROSYN) 500 MG tablet Take 1 tablet by mouth 2 times daily as needed for Pain Yes Sophia Sheldon MD   levonorgestrel [facility] [place]) IUD 52 mg 1 each by IntraUTERine route once Yes Ar Automatic Reconciliation       Past Medical

## 2023-09-30 LAB
A VAGINAE DNA VAG QL NAA+PROBE: ABNORMAL SCORE
BVAB2 DNA VAG QL NAA+PROBE: ABNORMAL SCORE
C ALBICANS DNA VAG QL NAA+PROBE: NEGATIVE
C GLABRATA DNA VAG QL NAA+PROBE: NEGATIVE
C TRACH RRNA SPEC QL NAA+PROBE: NEGATIVE
MEGA1 DNA VAG QL NAA+PROBE: ABNORMAL SCORE
N GONORRHOEA RRNA SPEC QL NAA+PROBE: NEGATIVE
SPECIMEN SOURCE: ABNORMAL
T VAGINALIS RRNA SPEC QL NAA+PROBE: NEGATIVE

## 2023-10-02 DIAGNOSIS — B96.89 BACTERIAL VAGINOSIS: ICD-10-CM

## 2023-10-02 DIAGNOSIS — N76.0 BACTERIAL VAGINOSIS: ICD-10-CM

## 2023-10-02 RX ORDER — METRONIDAZOLE 7.5 MG/G
GEL VAGINAL
Qty: 70 G | Refills: 2 | Status: SHIPPED | OUTPATIENT
Start: 2023-10-02 | End: 2023-10-09

## 2023-11-03 ENCOUNTER — PATIENT MESSAGE (OUTPATIENT)
Age: 38
End: 2023-11-03

## 2023-11-03 ENCOUNTER — TELEPHONE (OUTPATIENT)
Age: 38
End: 2023-11-03

## 2023-11-03 RX ORDER — FLUCONAZOLE 150 MG/1
TABLET ORAL
Qty: 2 TABLET | Refills: 0 | Status: SHIPPED | OUTPATIENT
Start: 2023-11-03

## 2023-11-22 DIAGNOSIS — N76.0 BACTERIAL VAGINOSIS: ICD-10-CM

## 2023-11-22 DIAGNOSIS — B96.89 BACTERIAL VAGINOSIS: ICD-10-CM

## 2023-11-24 RX ORDER — METRONIDAZOLE 500 MG/1
500 TABLET ORAL 2 TIMES DAILY
Qty: 14 TABLET | Refills: 0 | Status: SHIPPED | OUTPATIENT
Start: 2023-11-24 | End: 2023-12-01

## 2023-12-06 ENCOUNTER — OFFICE VISIT (OUTPATIENT)
Age: 38
End: 2023-12-06
Payer: COMMERCIAL

## 2023-12-06 VITALS
HEIGHT: 66 IN | WEIGHT: 180.56 LBS | OXYGEN SATURATION: 98 % | SYSTOLIC BLOOD PRESSURE: 112 MMHG | HEART RATE: 74 BPM | DIASTOLIC BLOOD PRESSURE: 82 MMHG | RESPIRATION RATE: 18 BRPM | BODY MASS INDEX: 29.02 KG/M2 | TEMPERATURE: 98.3 F

## 2023-12-06 DIAGNOSIS — R42 EPISODE OF DIZZINESS: ICD-10-CM

## 2023-12-06 DIAGNOSIS — M54.50 ACUTE MIDLINE LOW BACK PAIN WITHOUT SCIATICA: Primary | ICD-10-CM

## 2023-12-06 LAB
BILIRUBIN, URINE, POC: NORMAL
BLOOD URINE, POC: NEGATIVE
GLUCOSE URINE, POC: NEGATIVE
KETONES, URINE, POC: NORMAL
LEUKOCYTE ESTERASE, URINE, POC: NORMAL
NITRITE, URINE, POC: NEGATIVE
PH, URINE, POC: 6 (ref 4.6–8)
PROTEIN,URINE, POC: 100
SPECIFIC GRAVITY, URINE, POC: 1.03 (ref 1–1.03)
URINALYSIS CLARITY, POC: NORMAL
URINALYSIS COLOR, POC: YELLOW
UROBILINOGEN, POC: NORMAL

## 2023-12-06 PROCEDURE — 81003 URINALYSIS AUTO W/O SCOPE: CPT

## 2023-12-06 RX ORDER — CYCLOBENZAPRINE HCL 10 MG
10 TABLET ORAL 3 TIMES DAILY PRN
Qty: 30 TABLET | Refills: 0 | Status: SHIPPED | OUTPATIENT
Start: 2023-12-06 | End: 2023-12-16

## 2023-12-06 RX ORDER — FAMOTIDINE 20 MG/1
TABLET, FILM COATED ORAL
COMMUNITY
Start: 2023-10-19

## 2023-12-06 RX ORDER — NAPROXEN 500 MG/1
500 TABLET ORAL 2 TIMES DAILY WITH MEALS
Qty: 60 TABLET | Refills: 0 | Status: SHIPPED | OUTPATIENT
Start: 2023-12-06

## 2023-12-06 RX ORDER — METRONIDAZOLE 500 MG/1
500 TABLET ORAL 2 TIMES DAILY
COMMUNITY

## 2023-12-06 ASSESSMENT — ENCOUNTER SYMPTOMS
BACK PAIN: 1
CONSTIPATION: 0
NAUSEA: 1
DIARRHEA: 0
SHORTNESS OF BREATH: 0
EYES NEGATIVE: 1
RESPIRATORY NEGATIVE: 1
COUGH: 0
ALLERGIC/IMMUNOLOGIC NEGATIVE: 1
WHEEZING: 0
BLOOD IN STOOL: 0

## 2023-12-06 ASSESSMENT — PATIENT HEALTH QUESTIONNAIRE - PHQ9
SUM OF ALL RESPONSES TO PHQ9 QUESTIONS 1 & 2: 0
1. LITTLE INTEREST OR PLEASURE IN DOING THINGS: 0
SUM OF ALL RESPONSES TO PHQ QUESTIONS 1-9: 0
2. FEELING DOWN, DEPRESSED OR HOPELESS: 0
SUM OF ALL RESPONSES TO PHQ QUESTIONS 1-9: 0

## 2023-12-06 NOTE — PROGRESS NOTES
Chief Complaint   Patient presents with    Dizziness     Lightheaded, started this morning and felt over heated and nauseated for a minute and then subsided    Back Pain     Lower back, pain started yesterday. Positional discomfort         HPI:    Akua Jackson is a 43 y.o. female who presents for an acute visit. She reports midline low back pain that began last night; she does not recall a specific injury or event, but was busy yesterday bend and moving [employer] decorations and believes this may have initiated the pain. She took some ibuprofen last night and was able to sleep without difficulty; she took APAP this morning without much pain relief. She also notes a brief episode of dizziness and nausea that occurred this morning after taking APAP; symptoms lasted less than a minute and resolved spontaneously. Allergies   Allergen Reactions    Amoxicillin Hives     Other reaction(s): Unknown (comments)       Current Outpatient Medications   Medication Sig Dispense Refill    famotidine (PEPCID) 20 MG tablet TAKE 1 TAB BY MOUTH AS NEEDED FOR GASTROESOPHAGEAL REFLUX DISEASE      metroNIDAZOLE (FLAGYL) 500 MG tablet Take 1 tablet by mouth in the morning and at bedtime      cyclobenzaprine (FLEXERIL) 10 MG tablet Take 1 tablet by mouth 3 times daily as needed for Muscle spasms 30 tablet 0    naproxen (NAPROSYN) 500 MG tablet Take 1 tablet by mouth 2 times daily (with meals) 60 tablet 0    amLODIPine (NORVASC) 5 MG tablet TAKE 1 TABLET BY MOUTH TWICE DAILY 180 tablet 1    levonorgestrel (MIRENA) IUD 52 mg 1 each by IntraUTERine route once       No current facility-administered medications for this visit.          Past Medical History:   Diagnosis Date    Human papillomavirus in conditions classified elsewhere and of unspecified site 2006    Cervix    Menarche age 14    Migraine without aura, without mention of intractable migraine without mention of status migrainosus 2009       Family History   Problem Relation

## 2024-01-22 ENCOUNTER — TELEPHONE (OUTPATIENT)
Age: 39
End: 2024-01-22

## 2024-01-22 NOTE — TELEPHONE ENCOUNTER
Pt is calling because she needs to cancel her apt for today and she would like to r/s her apt.    [phone] cell

## 2024-02-07 ENCOUNTER — OFFICE VISIT (OUTPATIENT)
Age: 39
End: 2024-02-07
Payer: COMMERCIAL

## 2024-02-07 VITALS
WEIGHT: 177 LBS | OXYGEN SATURATION: 99 % | HEART RATE: 90 BPM | HEIGHT: 66 IN | DIASTOLIC BLOOD PRESSURE: 74 MMHG | RESPIRATION RATE: 18 BRPM | TEMPERATURE: 97.9 F | BODY MASS INDEX: 28.45 KG/M2 | SYSTOLIC BLOOD PRESSURE: 108 MMHG

## 2024-02-07 DIAGNOSIS — T14.8XXA MUSCLE STRAIN: ICD-10-CM

## 2024-02-07 DIAGNOSIS — R10.9 FLANK PAIN: Primary | ICD-10-CM

## 2024-02-07 LAB
BILIRUBIN, URINE, POC: ABNORMAL
BLOOD URINE, POC: NEGATIVE
GLUCOSE URINE, POC: NEGATIVE
KETONES, URINE, POC: ABNORMAL
LEUKOCYTE ESTERASE, URINE, POC: NEGATIVE
NITRITE, URINE, POC: NEGATIVE
PH, URINE, POC: 6.5 (ref 4.6–8)
PROTEIN,URINE, POC: 100
SPECIFIC GRAVITY, URINE, POC: 1.02 (ref 1–1.03)
URINALYSIS CLARITY, POC: CLEAR
URINALYSIS COLOR, POC: YELLOW
UROBILINOGEN, POC: ABNORMAL

## 2024-02-07 PROCEDURE — 81001 URINALYSIS AUTO W/SCOPE: CPT | Performed by: NURSE PRACTITIONER

## 2024-02-07 PROCEDURE — 3074F SYST BP LT 130 MM HG: CPT | Performed by: NURSE PRACTITIONER

## 2024-02-07 PROCEDURE — 3078F DIAST BP <80 MM HG: CPT | Performed by: NURSE PRACTITIONER

## 2024-02-07 PROCEDURE — 99213 OFFICE O/P EST LOW 20 MIN: CPT | Performed by: NURSE PRACTITIONER

## 2024-02-07 RX ORDER — FLUCONAZOLE 150 MG/1
TABLET ORAL
Qty: 2 TABLET | Refills: 0 | OUTPATIENT
Start: 2024-02-07

## 2024-02-07 RX ORDER — METRONIDAZOLE 500 MG/1
500 TABLET ORAL 2 TIMES DAILY
Qty: 14 TABLET | Refills: 2 | Status: SHIPPED | OUTPATIENT
Start: 2024-02-07

## 2024-02-07 RX ORDER — FLUCONAZOLE 150 MG/1
TABLET ORAL
Qty: 1 TABLET | Refills: 1 | Status: SHIPPED | OUTPATIENT
Start: 2024-02-07

## 2024-02-07 RX ORDER — METRONIDAZOLE 7.5 MG/G
GEL VAGINAL
COMMUNITY
Start: 2023-12-26

## 2024-02-07 SDOH — HEALTH STABILITY: PHYSICAL HEALTH: ON AVERAGE, HOW MANY MINUTES DO YOU ENGAGE IN EXERCISE AT THIS LEVEL?: 0 MIN

## 2024-02-07 SDOH — HEALTH STABILITY: PHYSICAL HEALTH: ON AVERAGE, HOW MANY DAYS PER WEEK DO YOU ENGAGE IN MODERATE TO STRENUOUS EXERCISE (LIKE A BRISK WALK)?: 0 DAYS

## 2024-02-07 SDOH — ECONOMIC STABILITY: HOUSING INSECURITY: IN THE LAST 12 MONTHS, HOW MANY PLACES HAVE YOU LIVED?: 1

## 2024-02-07 SDOH — ECONOMIC STABILITY: INCOME INSECURITY: IN THE LAST 12 MONTHS, WAS THERE A TIME WHEN YOU WERE NOT ABLE TO PAY THE MORTGAGE OR RENT ON TIME?: NO

## 2024-02-07 ASSESSMENT — PATIENT HEALTH QUESTIONNAIRE - PHQ9
SUM OF ALL RESPONSES TO PHQ QUESTIONS 1-9: 0
2. FEELING DOWN, DEPRESSED OR HOPELESS: 0
SUM OF ALL RESPONSES TO PHQ QUESTIONS 1-9: 0
1. LITTLE INTEREST OR PLEASURE IN DOING THINGS: 0
SUM OF ALL RESPONSES TO PHQ QUESTIONS 1-9: 0
SUM OF ALL RESPONSES TO PHQ9 QUESTIONS 1 & 2: 0
SUM OF ALL RESPONSES TO PHQ QUESTIONS 1-9: 0

## 2024-02-07 ASSESSMENT — SOCIAL DETERMINANTS OF HEALTH (SDOH)
DO YOU BELONG TO ANY CLUBS OR ORGANIZATIONS SUCH AS CHURCH GROUPS UNIONS, FRATERNAL OR ATHLETIC GROUPS, OR SCHOOL GROUPS?: NO
ARE YOU MARRIED, WIDOWED, DIVORCED, SEPARATED, NEVER MARRIED, OR LIVING WITH A PARTNER?: NEVER MARRIED
WITHIN THE LAST YEAR, HAVE YOU BEEN HUMILIATED OR EMOTIONALLY ABUSED IN OTHER WAYS BY YOUR PARTNER OR EX-PARTNER?: NO
WITHIN THE LAST YEAR, HAVE YOU BEEN KICKED, HIT, SLAPPED, OR OTHERWISE PHYSICALLY HURT BY YOUR PARTNER OR EX-PARTNER?: NO
HOW OFTEN DO YOU GET TOGETHER WITH FRIENDS OR RELATIVES?: MORE THAN THREE TIMES A WEEK
IN A TYPICAL WEEK, HOW MANY TIMES DO YOU TALK ON THE PHONE WITH FAMILY, FRIENDS, OR NEIGHBORS?: MORE THAN THREE TIMES A WEEK
WITHIN THE LAST YEAR, HAVE YOU BEEN AFRAID OF YOUR PARTNER OR EX-PARTNER?: NO
HOW OFTEN DO YOU ATTEND CHURCH OR RELIGIOUS SERVICES?: NEVER
WITHIN THE LAST YEAR, HAVE TO BEEN RAPED OR FORCED TO HAVE ANY KIND OF SEXUAL ACTIVITY BY YOUR PARTNER OR EX-PARTNER?: NO
HOW OFTEN DO YOU ATTENT MEETINGS OF THE CLUB OR ORGANIZATION YOU BELONG TO?: NEVER

## 2024-02-08 NOTE — PROGRESS NOTES
Chief Complaint   Patient presents with    Flank Pain     Right started Saturday       Vitals:    02/07/24 1353   BP: 108/74   Pulse: 90   Resp: 18   Temp: 97.9 °F (36.6 °C)   SpO2: 99%       
\"Have you been to the ER, urgent care clinic since your last visit?  Hospitalized since your last visit?\"    NO    “Have you seen or consulted any other health care providers outside of [facility] since your last visit?”    NO         
(2/7/2024)    Social Connection and Isolation Panel [NHANES]     Frequency of Communication with Friends and Family: More than three times a week     Frequency of Social Gatherings with Friends and Family: More than three times a week     Attends Islam Services: Never     Active Member of Clubs or Organizations: No     Attends Club or Organization Meetings: Never     Marital Status: Never    Intimate Partner Violence: Not At Risk (2/7/2024)    Humiliation, Afraid, Rape, and Kick questionnaire     Fear of Current or Ex-Partner: No     Emotionally Abused: No     Physically Abused: No     Sexually Abused: No   Housing Stability: Low Risk  (2/7/2024)    Housing Stability Vital Sign     Unable to Pay for Housing in the Last Year: No     Number of Places Lived in the Last Year: 1     Unstable Housing in the Last Year: No       Family History   Problem Relation Age of Onset    Elevated Lipids Mother         elevated cholesterol    Hypertension Maternal Aunt          Objective:     /74 (Site: Right Upper Arm, Position: Sitting, Cuff Size: Large Adult)   Pulse 90   Temp 97.9 °F (36.6 °C) (Temporal)   Resp 18   Ht 1.676 m (5' 6\")   Wt 80.3 kg (177 lb)   SpO2 99%   BMI 28.57 kg/m²   Body mass index is 28.57 kg/m².  General: Alert and oriented. No acute distress.  Well nourished  HENT :  Eyes: pupils equal, round, react to light and accommodationNose: patent. Mouth and throat is clear.  Neck:supple full range of motion no thyromegaly. Trachea midline, No carotid bruits. No significant lymphadenopathy  Lungs:: clear to auscultation without wheezes, rales, or rhonchi.  Heart :RRR, S1 & S2 are normal intensity. No murmur; no gallop  Abdomen: bowel sounds active. No tenderness, guarding, rebound, masses, hepatic or spleen enlargement  Back: no CVA tenderness.  Extremities: without clubbing, cyanosis, or edema  Pulses: radial and femoral pulses are normal  Neuro: HMF intact. Cranial nerves II through XII

## 2024-02-23 ENCOUNTER — OFFICE VISIT (OUTPATIENT)
Age: 39
End: 2024-02-23
Payer: COMMERCIAL

## 2024-02-23 VITALS
HEART RATE: 82 BPM | RESPIRATION RATE: 20 BRPM | SYSTOLIC BLOOD PRESSURE: 120 MMHG | OXYGEN SATURATION: 100 % | TEMPERATURE: 98.1 F | HEIGHT: 66 IN | BODY MASS INDEX: 28.7 KG/M2 | WEIGHT: 178.6 LBS | DIASTOLIC BLOOD PRESSURE: 60 MMHG

## 2024-02-23 DIAGNOSIS — R10.9 RIGHT FLANK PAIN: ICD-10-CM

## 2024-02-23 DIAGNOSIS — Z00.00 WELLNESS EXAMINATION: Primary | ICD-10-CM

## 2024-02-23 DIAGNOSIS — Z11.59 SCREENING FOR VIRAL DISEASE: ICD-10-CM

## 2024-02-23 DIAGNOSIS — Z13.1 ENCOUNTER FOR SCREENING EXAMINATION FOR IMPAIRED GLUCOSE REGULATION AND DIABETES MELLITUS: ICD-10-CM

## 2024-02-23 DIAGNOSIS — I10 ESSENTIAL HYPERTENSION: ICD-10-CM

## 2024-02-23 DIAGNOSIS — E55.9 VITAMIN D DEFICIENCY: ICD-10-CM

## 2024-02-23 LAB
BILIRUBIN, URINE, POC: NEGATIVE
BLOOD URINE, POC: ABNORMAL
GLUCOSE URINE, POC: NEGATIVE
KETONES, URINE, POC: NEGATIVE
LEUKOCYTE ESTERASE, URINE, POC: ABNORMAL
NITRITE, URINE, POC: NEGATIVE
PH, URINE, POC: 6.5 (ref 4.6–8)
PROTEIN,URINE, POC: ABNORMAL
SPECIFIC GRAVITY, URINE, POC: 1.02 (ref 1–1.03)
URINALYSIS CLARITY, POC: CLEAR
URINALYSIS COLOR, POC: YELLOW
UROBILINOGEN, POC: ABNORMAL

## 2024-02-23 PROCEDURE — 99396 PREV VISIT EST AGE 40-64: CPT | Performed by: NURSE PRACTITIONER

## 2024-02-23 PROCEDURE — 81003 URINALYSIS AUTO W/O SCOPE: CPT | Performed by: NURSE PRACTITIONER

## 2024-02-23 PROCEDURE — 3078F DIAST BP <80 MM HG: CPT | Performed by: NURSE PRACTITIONER

## 2024-02-23 PROCEDURE — 3074F SYST BP LT 130 MM HG: CPT | Performed by: NURSE PRACTITIONER

## 2024-02-23 PROCEDURE — 36415 COLL VENOUS BLD VENIPUNCTURE: CPT | Performed by: NURSE PRACTITIONER

## 2024-02-23 ASSESSMENT — PATIENT HEALTH QUESTIONNAIRE - PHQ9
1. LITTLE INTEREST OR PLEASURE IN DOING THINGS: 0
SUM OF ALL RESPONSES TO PHQ QUESTIONS 1-9: 0
2. FEELING DOWN, DEPRESSED OR HOPELESS: 0
SUM OF ALL RESPONSES TO PHQ QUESTIONS 1-9: 0
SUM OF ALL RESPONSES TO PHQ9 QUESTIONS 1 & 2: 0

## 2024-02-23 NOTE — PROGRESS NOTES
\"Have you been to the ER, urgent care clinic since your last visit?  Hospitalized since your last visit?\"    NO    “Have you seen or consulted any other health care providers outside of [facility] since your last visit?”    NO

## 2024-02-23 NOTE — PROGRESS NOTES
Chief Complaint   Patient presents with    Flank Pain     R side pain       ASSESSMENT AND PLAN:      Diagnosis Orders   1. Wellness examination  DE COLLECTION VENOUS BLOOD VENIPUNCTURE    CBC with Auto Differential    Comprehensive Metabolic Panel    Lipid Panel    TSH    Vitamin D 25 Hydroxy    Hepatitis C Antibody    HIV 1/2 Ag/Ab, 4TH Generation,W Rflx Confirm    Hemoglobin A1C    AMB POC URINALYSIS DIP STICK AUTO W/O MICRO    Hemoglobin A1C    HIV 1/2 Ag/Ab, 4TH Generation,W Rflx Confirm    Hepatitis C Antibody    Vitamin D 25 Hydroxy    TSH    Lipid Panel    Comprehensive Metabolic Panel    CBC with Auto Differential      2. Right flank pain  AMB POC URINALYSIS DIP STICK AUTO W/O MICRO      3. Essential hypertension  DE COLLECTION VENOUS BLOOD VENIPUNCTURE    CBC with Auto Differential    Comprehensive Metabolic Panel    Lipid Panel    TSH    TSH    Lipid Panel    Comprehensive Metabolic Panel    CBC with Auto Differential      4. Screening for viral disease  Hepatitis C Antibody    HIV 1/2 Ag/Ab, 4TH Generation,W Rflx Confirm    HIV 1/2 Ag/Ab, 4TH Generation,W Rflx Confirm    Hepatitis C Antibody      5. Encounter for screening examination for impaired glucose regulation and diabetes mellitus  DE COLLECTION VENOUS BLOOD VENIPUNCTURE    Hemoglobin A1C    Hemoglobin A1C      6. Vitamin D deficiency  DE COLLECTION VENOUS BLOOD VENIPUNCTURE    Vitamin D 25 Hydroxy    Vitamin D 25 Hydroxy        Check up labs today. Pap is up to date. Reviewed UA, unremarkable with exception of trace blood that wasn't present on her earlier sample. Ddx include MSK injury, renal nephrolithiasis. Continue heat, gentle stretching. If symptoms fail to improve in 2-4 weeks, we will pursue imaging. I also discussed with the patient s/s of a kidney stone and when to seek care in my office vs ER.    Patient aware of plan of care and verbalized understanding. Questions answered. RTC PRN.    HPI:     is a 39 y.o. female in today

## 2024-02-24 LAB
ALBUMIN SERPL-MCNC: 4.3 G/DL (ref 3.9–4.9)
ALBUMIN/GLOB SERPL: 1.5 {RATIO} (ref 1.2–2.2)
ALP SERPL-CCNC: 91 IU/L (ref 44–121)
ALT SERPL-CCNC: 11 IU/L (ref 0–32)
AST SERPL-CCNC: 12 IU/L (ref 0–40)
BASOPHILS # BLD AUTO: 0 X10E3/UL (ref 0–0.2)
BASOPHILS NFR BLD AUTO: 1 %
BILIRUB SERPL-MCNC: 0.3 MG/DL (ref 0–1.2)
BUN SERPL-MCNC: 5 MG/DL (ref 6–24)
BUN/CREAT SERPL: 6 (ref 9–23)
CALCIUM SERPL-MCNC: 9.4 MG/DL (ref 8.7–10.2)
CHLORIDE SERPL-SCNC: 107 MMOL/L (ref 96–106)
CHOLEST SERPL-MCNC: 168 MG/DL (ref 100–199)
CO2 SERPL-SCNC: 18 MMOL/L (ref 20–29)
CREAT SERPL-MCNC: 0.85 MG/DL (ref 0.57–1)
EGFRCR SERPLBLD CKD-EPI 2021: 89 ML/MIN/1.73
EOSINOPHIL # BLD AUTO: 0 X10E3/UL (ref 0–0.4)
EOSINOPHIL NFR BLD AUTO: 1 %
ERYTHROCYTE [DISTWIDTH] IN BLOOD BY AUTOMATED COUNT: 13.3 % (ref 11.7–15.4)
GLOBULIN SER CALC-MCNC: 2.8 G/DL (ref 1.5–4.5)
GLUCOSE SERPL-MCNC: 95 MG/DL (ref 70–99)
HBA1C MFR BLD: 5.4 % (ref 4.8–5.6)
HCT VFR BLD AUTO: 41.5 % (ref 34–46.6)
HCV IGG SERPL QL IA: NON REACTIVE
HDLC SERPL-MCNC: 50 MG/DL
HGB BLD-MCNC: 13.3 G/DL (ref 11.1–15.9)
HIV 1+2 AB+HIV1 P24 AG SERPL QL IA: NON REACTIVE
IMM GRANULOCYTES # BLD AUTO: 0 X10E3/UL (ref 0–0.1)
IMM GRANULOCYTES NFR BLD AUTO: 0 %
LDLC SERPL CALC-MCNC: 104 MG/DL (ref 0–99)
LYMPHOCYTES # BLD AUTO: 1.5 X10E3/UL (ref 0.7–3.1)
LYMPHOCYTES NFR BLD AUTO: 18 %
MCH RBC QN AUTO: 27.7 PG (ref 26.6–33)
MCHC RBC AUTO-ENTMCNC: 32 G/DL (ref 31.5–35.7)
MCV RBC AUTO: 86 FL (ref 79–97)
MONOCYTES # BLD AUTO: 0.5 X10E3/UL (ref 0.1–0.9)
MONOCYTES NFR BLD AUTO: 6 %
NEUTROPHILS # BLD AUTO: 6 X10E3/UL (ref 1.4–7)
NEUTROPHILS NFR BLD AUTO: 74 %
PLATELET # BLD AUTO: 251 X10E3/UL (ref 150–450)
POTASSIUM SERPL-SCNC: 4.4 MMOL/L (ref 3.5–5.2)
PROT SERPL-MCNC: 7.1 G/DL (ref 6–8.5)
RBC # BLD AUTO: 4.81 X10E6/UL (ref 3.77–5.28)
SODIUM SERPL-SCNC: 140 MMOL/L (ref 134–144)
TRIGL SERPL-MCNC: 71 MG/DL (ref 0–149)
TSH SERPL DL<=0.005 MIU/L-ACNC: 1.71 UIU/ML (ref 0.45–4.5)
VLDLC SERPL CALC-MCNC: 14 MG/DL (ref 5–40)
WBC # BLD AUTO: 8 X10E3/UL (ref 3.4–10.8)

## 2024-02-25 LAB — 25(OH)D3+25(OH)D2 SERPL-MCNC: 16.6 NG/ML (ref 30–100)

## 2024-02-26 ENCOUNTER — TELEPHONE (OUTPATIENT)
Age: 39
End: 2024-02-26

## 2024-02-26 DIAGNOSIS — R10.9 RIGHT FLANK PAIN: Primary | ICD-10-CM

## 2024-02-26 DIAGNOSIS — I10 ESSENTIAL (PRIMARY) HYPERTENSION: ICD-10-CM

## 2024-02-26 RX ORDER — AMLODIPINE BESYLATE 5 MG/1
TABLET ORAL
Qty: 180 TABLET | Refills: 0 | Status: SHIPPED | OUTPATIENT
Start: 2024-02-26

## 2024-02-26 NOTE — TELEPHONE ENCOUNTER
PCP: Eleni Jack, APRN - NP    Last appt: 1/2023  Future Appointments   Date Time Provider Department Center   5/13/2024  3:00 PM Jag Cannon MD [facility]       Requested Prescriptions     Signed Prescriptions Disp Refills    amLODIPine (NORVASC) 5 MG tablet 180 tablet 0     Sig: TAKE 1 TABLET BY MOUTH TWICE DAILY     Authorizing Provider: JAG CANNON     Ordering User: JAILENE GARZA         Other Comments:  Verbal order per provider.  Order (medication, dose, route, frequency, amount, refills) repeated and verified twice.

## 2024-03-08 ENCOUNTER — APPOINTMENT (OUTPATIENT)
Facility: HOSPITAL | Age: 39
End: 2024-03-08
Payer: COMMERCIAL

## 2024-03-08 ENCOUNTER — HOSPITAL ENCOUNTER (EMERGENCY)
Facility: HOSPITAL | Age: 39
Discharge: HOME | End: 2024-03-08
Attending: FAMILY MEDICINE
Payer: COMMERCIAL

## 2024-03-08 VITALS
DIASTOLIC BLOOD PRESSURE: 90 MMHG | SYSTOLIC BLOOD PRESSURE: 124 MMHG | BODY MASS INDEX: 28.61 KG/M2 | RESPIRATION RATE: 18 BRPM | HEART RATE: 87 BPM | WEIGHT: 178 LBS | OXYGEN SATURATION: 100 % | HEIGHT: 66 IN | TEMPERATURE: 99.7 F

## 2024-03-08 DIAGNOSIS — R10.9 RIGHT FLANK PAIN: Primary | ICD-10-CM

## 2024-03-08 LAB
ALBUMIN SERPL-MCNC: 3.9 G/DL (ref 3.5–5)
ALBUMIN/GLOB SERPL: 1.1 (ref 1.1–2.2)
ALP SERPL-CCNC: 95 U/L (ref 45–117)
ALT SERPL-CCNC: 16 U/L (ref 12–78)
ANION GAP SERPL CALC-SCNC: 12 MMOL/L (ref 5–15)
APPEARANCE UR: CLEAR
AST SERPL-CCNC: 13 U/L (ref 15–37)
BACTERIA URNS QL MICRO: ABNORMAL /HPF
BASOPHILS # BLD: 0.1 K/UL (ref 0–0.1)
BASOPHILS NFR BLD: 1 % (ref 0–1)
BILIRUB SERPL-MCNC: 0.3 MG/DL (ref 0.2–1)
BILIRUB UR QL: NEGATIVE
BUN SERPL-MCNC: 11 MG/DL (ref 6–20)
BUN/CREAT SERPL: 12 (ref 12–20)
CALCIUM SERPL-MCNC: 9.6 MG/DL (ref 8.5–10.1)
CHLORIDE SERPL-SCNC: 104 MMOL/L (ref 97–108)
CO2 SERPL-SCNC: 24 MMOL/L (ref 21–32)
COLOR UR: ABNORMAL
CREAT SERPL-MCNC: 0.95 MG/DL (ref 0.55–1.02)
DIFFERENTIAL METHOD BLD: ABNORMAL
EOSINOPHIL # BLD: 0.1 K/UL (ref 0–0.4)
EOSINOPHIL NFR BLD: 1 % (ref 0–7)
EPITH CASTS URNS QL MICRO: ABNORMAL /LPF
ERYTHROCYTE [DISTWIDTH] IN BLOOD BY AUTOMATED COUNT: 13.4 % (ref 11.5–14.5)
GLOBULIN SER CALC-MCNC: 3.5 G/DL (ref 2–4)
GLUCOSE SERPL-MCNC: 94 MG/DL (ref 65–100)
GLUCOSE UR STRIP.AUTO-MCNC: NEGATIVE MG/DL
HCG UR QL: NEGATIVE
HCT VFR BLD AUTO: 38.5 % (ref 35–47)
HGB BLD-MCNC: 12.6 G/DL (ref 11.5–16)
HGB UR QL STRIP: NEGATIVE
IMM GRANULOCYTES # BLD AUTO: 0 K/UL (ref 0–0.04)
IMM GRANULOCYTES NFR BLD AUTO: 0 % (ref 0–0.5)
KETONES UR QL STRIP.AUTO: ABNORMAL MG/DL
LEUKOCYTE ESTERASE UR QL STRIP.AUTO: NEGATIVE
LYMPHOCYTES # BLD: 2.1 K/UL (ref 0.8–3.5)
LYMPHOCYTES NFR BLD: 24 % (ref 12–49)
MCH RBC QN AUTO: 27.2 PG (ref 26–34)
MCHC RBC AUTO-ENTMCNC: 32.7 G/DL (ref 30–36.5)
MCV RBC AUTO: 83 FL (ref 80–99)
MONOCYTES # BLD: 0.6 K/UL (ref 0–1)
MONOCYTES NFR BLD: 7 % (ref 5–13)
NEUTS SEG # BLD: 6 K/UL (ref 1.8–8)
NEUTS SEG NFR BLD: 67 % (ref 32–75)
NITRITE UR QL STRIP.AUTO: NEGATIVE
NRBC # BLD: 0 K/UL (ref 0–0.01)
NRBC BLD-RTO: 0 PER 100 WBC
PH UR STRIP: 6 (ref 5–8)
PLATELET # BLD AUTO: 204 K/UL (ref 150–400)
PMV BLD AUTO: 13 FL (ref 8.9–12.9)
POTASSIUM SERPL-SCNC: 3.8 MMOL/L (ref 3.5–5.1)
PROT SERPL-MCNC: 7.4 G/DL (ref 6.4–8.2)
PROT UR STRIP-MCNC: NEGATIVE MG/DL
RBC # BLD AUTO: 4.64 M/UL (ref 3.8–5.2)
RBC #/AREA URNS HPF: ABNORMAL /HPF (ref 0–5)
SODIUM SERPL-SCNC: 140 MMOL/L (ref 136–145)
SP GR UR REFRACTOMETRY: 1.02 (ref 1–1.03)
URINE CULTURE IF INDICATED: ABNORMAL
UROBILINOGEN UR QL STRIP.AUTO: 0.2 EU/DL (ref 0.2–1)
WBC # BLD AUTO: 8.9 K/UL (ref 3.6–11)
WBC URNS QL MICRO: ABNORMAL /HPF (ref 0–4)

## 2024-03-08 PROCEDURE — 80053 COMPREHEN METABOLIC PANEL: CPT

## 2024-03-08 PROCEDURE — 74177 CT ABD & PELVIS W/CONTRAST: CPT

## 2024-03-08 PROCEDURE — 87086 URINE CULTURE/COLONY COUNT: CPT

## 2024-03-08 PROCEDURE — 85025 COMPLETE CBC W/AUTO DIFF WBC: CPT

## 2024-03-08 PROCEDURE — 99285 EMERGENCY DEPT VISIT HI MDM: CPT

## 2024-03-08 PROCEDURE — 81001 URINALYSIS AUTO W/SCOPE: CPT

## 2024-03-08 PROCEDURE — 6360000004 HC RX CONTRAST MEDICATION: Performed by: FAMILY MEDICINE

## 2024-03-08 PROCEDURE — 81025 URINE PREGNANCY TEST: CPT

## 2024-03-08 RX ORDER — CYCLOBENZAPRINE HCL 10 MG
10 TABLET ORAL 3 TIMES DAILY PRN
Qty: 21 TABLET | Refills: 0 | Status: SHIPPED | OUTPATIENT
Start: 2024-03-08 | End: 2024-03-18

## 2024-03-08 RX ORDER — NAPROXEN 500 MG/1
500 TABLET ORAL 2 TIMES DAILY
Qty: 20 TABLET | Refills: 0 | Status: SHIPPED | OUTPATIENT
Start: 2024-03-08 | End: 2024-03-18

## 2024-03-08 RX ADMIN — IOPAMIDOL 100 ML: 612 INJECTION, SOLUTION INTRAVENOUS at 21:17

## 2024-03-08 ASSESSMENT — PAIN SCALES - GENERAL: PAINLEVEL_OUTOF10: 7

## 2024-03-08 ASSESSMENT — PAIN DESCRIPTION - ORIENTATION: ORIENTATION: RIGHT

## 2024-03-08 ASSESSMENT — LIFESTYLE VARIABLES
HOW OFTEN DO YOU HAVE A DRINK CONTAINING ALCOHOL: MONTHLY OR LESS
HOW MANY STANDARD DRINKS CONTAINING ALCOHOL DO YOU HAVE ON A TYPICAL DAY: 1 OR 2

## 2024-03-08 ASSESSMENT — PAIN DESCRIPTION - DESCRIPTORS: DESCRIPTORS: SHARP;SORE

## 2024-03-08 ASSESSMENT — PAIN - FUNCTIONAL ASSESSMENT: PAIN_FUNCTIONAL_ASSESSMENT: 0-10

## 2024-03-08 ASSESSMENT — PAIN DESCRIPTION - LOCATION: LOCATION: FLANK

## 2024-03-09 NOTE — ED TRIAGE NOTES
Right flank pain, patient has a appointment with her primary care next week but the pain intensified 7/10 pain. No home treatment attempted.

## 2024-03-09 NOTE — FLOWSHEET NOTE
Discharge instructions given and medication pickup discussed. Patient ambulatory at time of discharge.

## 2024-03-09 NOTE — ED PROVIDER NOTES
No weakness.      Coordination: Coordination normal.      Gait: Gait normal.   Psychiatric:         Mood and Affect: Mood normal.         Behavior: Behavior normal.         Thought Content: Thought content normal.         Judgment: Judgment normal.            DIAGNOSTIC RESULTS   LABS:     Recent Results (from the past 24 hour(s))   CBC with Auto Differential    Collection Time: 03/08/24  7:30 PM   Result Value Ref Range    WBC 8.9 3.6 - 11.0 K/uL    RBC 4.64 3.80 - 5.20 M/uL    Hemoglobin 12.6 11.5 - 16.0 g/dL    Hematocrit 38.5 35.0 - 47.0 %    MCV 83.0 80.0 - 99.0 FL    MCH 27.2 26.0 - 34.0 PG    MCHC 32.7 30.0 - 36.5 g/dL    RDW 13.4 11.5 - 14.5 %    Platelets 204 150 - 400 K/uL    MPV 13.0 (H) 8.9 - 12.9 FL    Nucleated RBCs 0.0 0  WBC    nRBC 0.00 0.00 - 0.01 K/uL    Neutrophils % 67 32 - 75 %    Lymphocytes % 24 12 - 49 %    Monocytes % 7 5 - 13 %    Eosinophils % 1 0 - 7 %    Basophils % 1 0 - 1 %    Immature Granulocytes 0 0.0 - 0.5 %    Neutrophils Absolute 6.0 1.8 - 8.0 K/UL    Lymphocytes Absolute 2.1 0.8 - 3.5 K/UL    Monocytes Absolute 0.6 0.0 - 1.0 K/UL    Eosinophils Absolute 0.1 0.0 - 0.4 K/UL    Basophils Absolute 0.1 0.0 - 0.1 K/UL    Absolute Immature Granulocyte 0.0 0.00 - 0.04 K/UL    Differential Type AUTOMATED     CMP    Collection Time: 03/08/24  7:30 PM   Result Value Ref Range    Sodium 140 136 - 145 mmol/L    Potassium 3.8 3.5 - 5.1 mmol/L    Chloride 104 97 - 108 mmol/L    CO2 24 21 - 32 mmol/L    Anion Gap 12 5 - 15 mmol/L    Glucose 94 65 - 100 mg/dL    BUN 11 6 - 20 MG/DL    Creatinine 0.95 0.55 - 1.02 MG/DL    Bun/Cre Ratio 12 12 - 20      Est, Glom Filt Rate >60 >60 ml/min/1.73m2    Calcium 9.6 8.5 - 10.1 MG/DL    Total Bilirubin 0.3 0.2 - 1.0 MG/DL    ALT 16 12 - 78 U/L    AST 13 (L) 15 - 37 U/L    Alk Phosphatase 95 45 - 117 U/L    Total Protein 7.4 6.4 - 8.2 g/dL    Albumin 3.9 3.5 - 5.0 g/dL    Globulin 3.5 2.0 - 4.0 g/dL    Albumin/Globulin Ratio 1.1 1.1 - 2.2

## 2024-03-09 NOTE — DISCHARGE INSTRUCTIONS
Return if worse or for any change in symptoms, increase fluid intake over the next 24 hours.  Check urine culture results within the next 2 to 3 days.  Lidoderm patch 12 hours of the day to the area that hurts.  This may be obtained without a prescription.  Naprosyn 500 mg twice daily, Flexeril at bedtime for spasm.  Heat to the area that hurts may be helpful.  Follow-up exam and symptoms with primary care doctor within the next 3 to 4 days.

## 2024-03-10 LAB
BACTERIA SPEC CULT: NORMAL
CC UR VC: NORMAL
SERVICE CMNT-IMP: NORMAL

## 2024-03-13 ENCOUNTER — TELEPHONE (OUTPATIENT)
Age: 39
End: 2024-03-13

## 2024-03-13 NOTE — TELEPHONE ENCOUNTER
[facility] radiology called stating that pt is scheduled to have CT on Friday but she had CT done on 3/8 when she went to ER.  States it was the same order only one is with IV contrast and the other is w/o.  They would like to know if you still want her to have this. Please advise.

## 2024-03-13 NOTE — TELEPHONE ENCOUNTER
[facility], to inform of Caridad's message response of not to do the CT scheduled for Friday.  OK of understanding per Joyce, she will call and inform the pt of.

## 2024-04-08 RX ORDER — METRONIDAZOLE 7.5 MG/G
GEL VAGINAL
Qty: 70 G | Refills: 0 | OUTPATIENT
Start: 2024-04-08

## 2024-04-08 RX ORDER — FAMOTIDINE 20 MG/1
TABLET, FILM COATED ORAL
Qty: 60 TABLET | Refills: 5 | Status: SHIPPED | OUTPATIENT
Start: 2024-04-08

## 2024-04-08 RX ORDER — METRONIDAZOLE 7.5 MG/G
GEL VAGINAL
Qty: 70 G | Refills: 0 | Status: SHIPPED | OUTPATIENT
Start: 2024-04-08

## 2024-04-08 NOTE — TELEPHONE ENCOUNTER
Patient requesting refill on     Requested Prescriptions     Pending Prescriptions Disp Refills    famotidine (PEPCID) 20 MG tablet [Pharmacy Med Name: Famotidine 20 MG Oral Tablet] 60 tablet 0     Sig: TAKE 1 TAB BY MOUTH AS NEEDED FOR GASTROESOPHAGEAL REFLUX DISEASE        Last OV 2/7/2024

## 2024-05-13 ENCOUNTER — OFFICE VISIT (OUTPATIENT)
Age: 39
End: 2024-05-13
Payer: COMMERCIAL

## 2024-05-13 VITALS
HEART RATE: 91 BPM | WEIGHT: 178 LBS | OXYGEN SATURATION: 97 % | DIASTOLIC BLOOD PRESSURE: 84 MMHG | BODY MASS INDEX: 28.61 KG/M2 | TEMPERATURE: 97.9 F | HEIGHT: 66 IN | SYSTOLIC BLOOD PRESSURE: 106 MMHG | RESPIRATION RATE: 20 BRPM

## 2024-05-13 DIAGNOSIS — R07.89 CHEST DISCOMFORT: Primary | ICD-10-CM

## 2024-05-13 DIAGNOSIS — I10 ESSENTIAL HYPERTENSION: ICD-10-CM

## 2024-05-13 DIAGNOSIS — R00.2 PALPITATIONS: ICD-10-CM

## 2024-05-13 PROCEDURE — 3074F SYST BP LT 130 MM HG: CPT | Performed by: INTERNAL MEDICINE

## 2024-05-13 PROCEDURE — 99213 OFFICE O/P EST LOW 20 MIN: CPT | Performed by: INTERNAL MEDICINE

## 2024-05-13 PROCEDURE — 3079F DIAST BP 80-89 MM HG: CPT | Performed by: INTERNAL MEDICINE

## 2024-05-13 PROCEDURE — 93000 ELECTROCARDIOGRAM COMPLETE: CPT | Performed by: INTERNAL MEDICINE

## 2024-05-13 ASSESSMENT — PATIENT HEALTH QUESTIONNAIRE - PHQ9
SUM OF ALL RESPONSES TO PHQ QUESTIONS 1-9: 0
SUM OF ALL RESPONSES TO PHQ QUESTIONS 1-9: 0
SUM OF ALL RESPONSES TO PHQ9 QUESTIONS 1 & 2: 0
1. LITTLE INTEREST OR PLEASURE IN DOING THINGS: NOT AT ALL
2. FEELING DOWN, DEPRESSED OR HOPELESS: NOT AT ALL
SUM OF ALL RESPONSES TO PHQ QUESTIONS 1-9: 0
SUM OF ALL RESPONSES TO PHQ QUESTIONS 1-9: 0

## 2024-05-13 NOTE — PROGRESS NOTES
Identified pt with two pt identifiers(name and ). Reviewed record in preparation for visit and have obtained necessary documentation.  Chief Complaint   Patient presents with    Hypertension      /84 (Site: Left Upper Arm, Position: Sitting, Cuff Size: Medium Adult)   Pulse 91   Temp 97.9 °F (36.6 °C) (Temporal)   Resp 20   Ht 1.676 m (5' 6\")   Wt 80.7 kg (178 lb)   SpO2 97%   BMI 28.73 kg/m²       Medications reviewed/approved by provider.      Health Maintenance Review: Patient reminded of \"due or due soon\" health maintenance. I have asked the patient to contact his/her primary care provider (PCP) for follow-up on his/her health maintenance.    Coordination of Care Questionnaire:  :   1) Have you been to an emergency room, urgent care, or hospitalized since your last visit?  If yes, where when, and reason for visit? yes 3/8 [facility] right flank pain      2. Have seen or consulted any other health care provider since your last visit?   If yes, where when, and reason for visit?  no      Patient is accompanied by self I have received verbal consent from Tosin Haines to discuss any/all medical information while they are present in the room.    
pain, muscle weakness, joint pain  Endocrine: Not present - Excessive thirst, excessive urination, hair loss  Integumentary: Not present - Rashes, suspicious lesions  Neurological: Not present - Balance and gait difficulties, focal neurological symptoms  Psychiatric: Increased stressors.  Not present - Anxiety, depression      VITAL SIGNS:  /84 (Site: Left Upper Arm, Position: Sitting, Cuff Size: Medium Adult)   Pulse 91   Temp 97.9 °F (36.6 °C) (Temporal)   Resp 20   Ht 1.676 m (5' 6\")   Wt 80.7 kg (178 lb)   SpO2 97% Comment: RA  BMI 28.73 kg/m²      Body mass index is 28.73 kg/m².    Wt Readings from Last 5 Encounters:   05/13/24 80.7 kg (178 lb)   03/08/24 80.7 kg (178 lb)   02/23/24 81 kg (178 lb 9.6 oz)   02/07/24 80.3 kg (177 lb)   12/06/23 81.9 kg (180 lb 9 oz)       BP Readings from Last 5 Encounters:   05/13/24 106/84   03/08/24 (!) 124/90   02/23/24 120/60   02/07/24 108/74   12/06/23 112/82       PHYSICAL EXAM:  General: No distress, cooperative and alert  CV: Regular rate and rhythm; normal S1/S2, no gallop, no murmur, no rub, no JVD, normal carotid upstrokes, no carotid bruits  Respiratory: Adequate air movement with normal effort, clear to auscultation, no wheezes, no ronchi, no rales  Abdomen: Soft, nontender, nondistended, normal bowel sounds. No audible bruits  Extremities: Warm and well perfused, normal cap refill, no clubbing or cyanosis. No edema  Neuro: No focal neurologic abnormalities      Pertinent studies and test results were reviewed with the patient today.     CBC   Lab Results   Component Value Date    WBC 8.9 03/08/2024    HGB 12.6 03/08/2024    HCT 38.5 03/08/2024    MCV 83.0 03/08/2024     03/08/2024       CMP  Lab Results   Component Value Date     03/08/2024    K 3.8 03/08/2024     03/08/2024    CO2 24 03/08/2024    BUN 11 03/08/2024    CREATININE 0.95 03/08/2024    GLUCOSE 94 03/08/2024    CALCIUM 9.6 03/08/2024    BILITOT 0.3 03/08/2024    ALKPHOS

## 2024-05-31 RX ORDER — METRONIDAZOLE 7.5 MG/G
GEL VAGINAL
Qty: 70 G | Refills: 0 | Status: SHIPPED | OUTPATIENT
Start: 2024-05-31

## 2024-06-05 NOTE — TELEPHONE ENCOUNTER
Patient requesting refill on     Requested Prescriptions     Pending Prescriptions Disp Refills    metroNIDAZOLE (METROGEL) 0.75 % vaginal gel 70 g 0     Sig: INSERT 1 APPLICATORFUL VAGINALLY  ONCE DAILY AT NIGHT FOR 5 DAYS        Last OV 2/23/2024

## 2024-06-06 RX ORDER — METRONIDAZOLE 7.5 MG/G
GEL VAGINAL
Qty: 70 G | Refills: 0 | OUTPATIENT
Start: 2024-06-06

## 2024-06-07 DIAGNOSIS — I10 ESSENTIAL (PRIMARY) HYPERTENSION: ICD-10-CM

## 2024-06-07 RX ORDER — AMLODIPINE BESYLATE 5 MG/1
5 TABLET ORAL 2 TIMES DAILY
Qty: 180 TABLET | Refills: 1 | Status: SHIPPED | OUTPATIENT
Start: 2024-06-07

## 2024-06-07 NOTE — TELEPHONE ENCOUNTER
Requested Prescriptions     Signed Prescriptions Disp Refills    amLODIPine (NORVASC) 5 MG tablet 180 tablet 1     Sig: Take 1 tablet by mouth 2 times daily     Authorizing Provider: AMY CANNON     Ordering User: AIRAM WAN

## 2024-07-10 DIAGNOSIS — B37.31 VULVOVAGINAL CANDIDIASIS: ICD-10-CM

## 2024-07-11 RX ORDER — FLUCONAZOLE 100 MG/1
100 TABLET ORAL DAILY
Qty: 7 TABLET | Refills: 0 | OUTPATIENT
Start: 2024-07-11 | End: 2024-07-18

## 2024-07-23 RX ORDER — FLUCONAZOLE 150 MG/1
TABLET ORAL
Qty: 1 TABLET | Refills: 1 | OUTPATIENT
Start: 2024-07-23

## 2024-07-23 RX ORDER — METRONIDAZOLE 500 MG/1
500 TABLET ORAL 2 TIMES DAILY
Qty: 14 TABLET | Refills: 2 | OUTPATIENT
Start: 2024-07-23

## 2024-08-06 RX ORDER — ERGOCALCIFEROL 1.25 MG/1
50000 CAPSULE ORAL WEEKLY
Qty: 12 CAPSULE | Refills: 1 | Status: SHIPPED | OUTPATIENT
Start: 2024-08-06

## 2024-08-06 RX ORDER — AZITHROMYCIN 250 MG/1
TABLET, FILM COATED ORAL
Qty: 6 TABLET | Refills: 0 | OUTPATIENT
Start: 2024-08-06

## 2024-08-16 ENCOUNTER — OFFICE VISIT (OUTPATIENT)
Age: 39
End: 2024-08-16

## 2024-08-16 VITALS
SYSTOLIC BLOOD PRESSURE: 122 MMHG | DIASTOLIC BLOOD PRESSURE: 76 MMHG | RESPIRATION RATE: 18 BRPM | HEART RATE: 80 BPM | WEIGHT: 179.8 LBS | BODY MASS INDEX: 29.02 KG/M2 | OXYGEN SATURATION: 99 % | TEMPERATURE: 97.7 F

## 2024-08-16 DIAGNOSIS — J30.1 SEASONAL ALLERGIC RHINITIS DUE TO POLLEN: ICD-10-CM

## 2024-08-16 DIAGNOSIS — N89.8 VAGINAL DISCHARGE: Primary | ICD-10-CM

## 2024-08-16 RX ORDER — FLUTICASONE PROPIONATE 50 MCG
2 SPRAY, SUSPENSION (ML) NASAL DAILY
Qty: 48 G | Refills: 1 | Status: SHIPPED | OUTPATIENT
Start: 2024-08-16

## 2024-08-16 RX ORDER — DEXAMETHASONE SODIUM PHOSPHATE 4 MG/ML
4 INJECTION, SOLUTION INTRA-ARTICULAR; INTRALESIONAL; INTRAMUSCULAR; INTRAVENOUS; SOFT TISSUE ONCE
Status: COMPLETED | OUTPATIENT
Start: 2024-08-16 | End: 2024-08-16

## 2024-08-16 RX ORDER — METHYLPREDNISOLONE ACETATE 40 MG/ML
40 INJECTION, SUSPENSION INTRA-ARTICULAR; INTRALESIONAL; INTRAMUSCULAR; SOFT TISSUE ONCE
Status: COMPLETED | OUTPATIENT
Start: 2024-08-16 | End: 2024-08-16

## 2024-08-16 RX ADMIN — DEXAMETHASONE SODIUM PHOSPHATE 4 MG: 4 INJECTION, SOLUTION INTRA-ARTICULAR; INTRALESIONAL; INTRAMUSCULAR; INTRAVENOUS; SOFT TISSUE at 10:48

## 2024-08-16 RX ADMIN — METHYLPREDNISOLONE ACETATE 40 MG: 40 INJECTION, SUSPENSION INTRA-ARTICULAR; INTRALESIONAL; INTRAMUSCULAR; SOFT TISSUE at 10:46

## 2024-08-16 ASSESSMENT — ENCOUNTER SYMPTOMS
BLOOD IN STOOL: 0
EYES NEGATIVE: 1
RHINORRHEA: 1
SHORTNESS OF BREATH: 0
WHEEZING: 0
DIARRHEA: 0
RESPIRATORY NEGATIVE: 1
COUGH: 0
CONSTIPATION: 0
ALLERGIC/IMMUNOLOGIC NEGATIVE: 1
SINUS PRESSURE: 1

## 2024-08-16 NOTE — PROGRESS NOTES
\"Have you been to the ER, urgent care clinic since your last visit?  Hospitalized since your last visit?\"    NO    “Have you seen or consulted any other health care providers outside of [facility] since your last visit?”    NO    Chief Complaint   Patient presents with    Vaginal Discharge     And odor x few days     Sinus Problem     Pressure and body aches x 1 wk        Vitals:    08/16/24 1010   BP: 122/76   Pulse: 80   Resp: 18   Temp: 97.7 °F (36.5 °C)   SpO2: 99%       Click Here for Release of Records Request    
allergic rhinitis due to pollen   Continue symptomatic management of symptoms; APAP per package instructions for discomfort, antihistamine, daily Flonase.    Orders:    dexAMETHasone (DECADRON) injection 4 mg    methylPREDNISolone acetate (DEPO-MEDROL) injection 40 mg    fluticasone (FLONASE) 50 MCG/ACT nasal spray; 2 sprays by Each Nostril route daily       Medication Side Effects and Warnings were discussed with patient: Yes  Patient Labs were reviewed:  Yes  Patient Past Records were reviewed:  Yes    Patient aware of plan of care and verbalized understanding. Questions answered. RTC PRN or sooner if needed.    On this date 8/16/2024 I have spent 30 minutes reviewing previous notes, test results and face to face with the patient discussing the diagnosis and importance of compliance with the treatment plan as well as documenting on the day of the visit.    RACHELL Hall - NP

## 2024-08-21 LAB
A VAGINAE DNA VAG QL NAA+PROBE: ABNORMAL SCORE
BVAB2 DNA VAG QL NAA+PROBE: ABNORMAL SCORE
C ALBICANS DNA VAG QL NAA+PROBE: NEGATIVE
C GLABRATA DNA VAG QL NAA+PROBE: NEGATIVE
C KRUSEI DNA VAG QL NAA+PROBE: NEGATIVE
C LUSITANIAE DNA VAG QL NAA+PROBE: NEGATIVE
C TRACH DNA SPEC QL NAA+PROBE: NEGATIVE
CANDIDA DNA VAG QL NAA+PROBE: NEGATIVE
MEGA1 DNA VAG QL NAA+PROBE: ABNORMAL SCORE
N GONORRHOEA DNA VAG QL NAA+PROBE: NEGATIVE
T VAGINALIS DNA VAG QL NAA+PROBE: NEGATIVE

## 2024-09-20 ENCOUNTER — OFFICE VISIT (OUTPATIENT)
Age: 39
End: 2024-09-20
Payer: COMMERCIAL

## 2024-09-20 VITALS
TEMPERATURE: 97.9 F | HEIGHT: 66 IN | BODY MASS INDEX: 28.7 KG/M2 | OXYGEN SATURATION: 98 % | WEIGHT: 178.6 LBS | RESPIRATION RATE: 20 BRPM | DIASTOLIC BLOOD PRESSURE: 60 MMHG | SYSTOLIC BLOOD PRESSURE: 110 MMHG | HEART RATE: 85 BPM

## 2024-09-20 DIAGNOSIS — L21.9 ACUTE SEBORRHEIC DERMATITIS: ICD-10-CM

## 2024-09-20 DIAGNOSIS — L85.3 XEROSIS OF SKIN: Primary | ICD-10-CM

## 2024-09-20 PROCEDURE — 3078F DIAST BP <80 MM HG: CPT | Performed by: NURSE PRACTITIONER

## 2024-09-20 PROCEDURE — 99214 OFFICE O/P EST MOD 30 MIN: CPT | Performed by: NURSE PRACTITIONER

## 2024-09-20 PROCEDURE — 3074F SYST BP LT 130 MM HG: CPT | Performed by: NURSE PRACTITIONER

## 2024-09-20 RX ORDER — MEDROXYPROGESTERONE ACETATE 150 MG/ML
INJECTION, SUSPENSION INTRAMUSCULAR
COMMUNITY
Start: 2024-08-30

## 2024-09-20 RX ORDER — TRIAMCINOLONE ACETONIDE 5 MG/G
CREAM TOPICAL
Qty: 454 G | Refills: 0 | Status: SHIPPED | OUTPATIENT
Start: 2024-09-20 | End: 2024-09-27

## 2024-09-20 SDOH — HEALTH STABILITY: PHYSICAL HEALTH: ON AVERAGE, HOW MANY MINUTES DO YOU ENGAGE IN EXERCISE AT THIS LEVEL?: 120 MIN

## 2024-09-20 SDOH — SOCIAL STABILITY: SOCIAL INSECURITY: WITHIN THE LAST YEAR, HAVE YOU BEEN HUMILIATED OR EMOTIONALLY ABUSED IN OTHER WAYS BY YOUR PARTNER OR EX-PARTNER?: NO

## 2024-09-20 SDOH — SOCIAL STABILITY: SOCIAL NETWORK
DO YOU BELONG TO ANY CLUBS OR ORGANIZATIONS SUCH AS CHURCH GROUPS UNIONS, FRATERNAL OR ATHLETIC GROUPS, OR SCHOOL GROUPS?: NO

## 2024-09-20 SDOH — SOCIAL STABILITY: SOCIAL INSECURITY
WITHIN THE LAST YEAR, HAVE YOU BEEN KICKED, HIT, SLAPPED, OR OTHERWISE PHYSICALLY HURT BY YOUR PARTNER OR EX-PARTNER?: NO

## 2024-09-20 SDOH — SOCIAL STABILITY: SOCIAL NETWORK: HOW OFTEN DO YOU ATTENT MEETINGS OF THE CLUB OR ORGANIZATION YOU BELONG TO?: MORE THAN 4 TIMES PER YEAR

## 2024-09-20 SDOH — ECONOMIC STABILITY: FOOD INSECURITY: WITHIN THE PAST 12 MONTHS, YOU WORRIED THAT YOUR FOOD WOULD RUN OUT BEFORE YOU GOT MONEY TO BUY MORE.: NEVER TRUE

## 2024-09-20 SDOH — SOCIAL STABILITY: SOCIAL INSECURITY: WITHIN THE LAST YEAR, HAVE YOU BEEN AFRAID OF YOUR PARTNER OR EX-PARTNER?: NO

## 2024-09-20 SDOH — SOCIAL STABILITY: SOCIAL NETWORK
IN A TYPICAL WEEK, HOW MANY TIMES DO YOU TALK ON THE PHONE WITH FAMILY, FRIENDS, OR NEIGHBORS?: MORE THAN THREE TIMES A WEEK

## 2024-09-20 SDOH — SOCIAL STABILITY: SOCIAL NETWORK: ARE YOU MARRIED, WIDOWED, DIVORCED, SEPARATED, NEVER MARRIED, OR LIVING WITH A PARTNER?: NEVER MARRIED

## 2024-09-20 SDOH — SOCIAL STABILITY: SOCIAL INSECURITY
WITHIN THE LAST YEAR, HAVE TO BEEN RAPED OR FORCED TO HAVE ANY KIND OF SEXUAL ACTIVITY BY YOUR PARTNER OR EX-PARTNER?: NO

## 2024-09-20 SDOH — HEALTH STABILITY: MENTAL HEALTH: HOW MANY STANDARD DRINKS CONTAINING ALCOHOL DO YOU HAVE ON A TYPICAL DAY?: 1 OR 2

## 2024-09-20 SDOH — HEALTH STABILITY: MENTAL HEALTH: HOW OFTEN DO YOU HAVE A DRINK CONTAINING ALCOHOL?: 2-4 TIMES A MONTH

## 2024-09-20 SDOH — ECONOMIC STABILITY: FOOD INSECURITY: WITHIN THE PAST 12 MONTHS, THE FOOD YOU BOUGHT JUST DIDN'T LAST AND YOU DIDN'T HAVE MONEY TO GET MORE.: NEVER TRUE

## 2024-09-20 SDOH — HEALTH STABILITY: MENTAL HEALTH
STRESS IS WHEN SOMEONE FEELS TENSE, NERVOUS, ANXIOUS, OR CAN'T SLEEP AT NIGHT BECAUSE THEIR MIND IS TROUBLED. HOW STRESSED ARE YOU?: ONLY A LITTLE

## 2024-09-20 SDOH — ECONOMIC STABILITY: INCOME INSECURITY: IN THE LAST 12 MONTHS, WAS THERE A TIME WHEN YOU WERE NOT ABLE TO PAY THE MORTGAGE OR RENT ON TIME?: NO

## 2024-09-20 SDOH — ECONOMIC STABILITY: INCOME INSECURITY: HOW HARD IS IT FOR YOU TO PAY FOR THE VERY BASICS LIKE FOOD, HOUSING, MEDICAL CARE, AND HEATING?: NOT HARD AT ALL

## 2024-09-20 SDOH — HEALTH STABILITY: PHYSICAL HEALTH: ON AVERAGE, HOW MANY DAYS PER WEEK DO YOU ENGAGE IN MODERATE TO STRENUOUS EXERCISE (LIKE A BRISK WALK)?: 5 DAYS

## 2024-09-20 SDOH — SOCIAL STABILITY: SOCIAL NETWORK: HOW OFTEN DO YOU ATTEND CHURCH OR RELIGIOUS SERVICES?: MORE THAN 4 TIMES PER YEAR

## 2024-09-20 SDOH — SOCIAL STABILITY: SOCIAL NETWORK: HOW OFTEN DO YOU GET TOGETHER WITH FRIENDS OR RELATIVES?: MORE THAN THREE TIMES A WEEK

## 2024-09-20 SDOH — ECONOMIC STABILITY: TRANSPORTATION INSECURITY
IN THE PAST 12 MONTHS, HAS THE LACK OF TRANSPORTATION KEPT YOU FROM MEDICAL APPOINTMENTS OR FROM GETTING MEDICATIONS?: NO

## 2024-09-20 ASSESSMENT — PATIENT HEALTH QUESTIONNAIRE - PHQ9
SUM OF ALL RESPONSES TO PHQ QUESTIONS 1-9: 0
SUM OF ALL RESPONSES TO PHQ QUESTIONS 1-9: 0
1. LITTLE INTEREST OR PLEASURE IN DOING THINGS: NOT AT ALL
2. FEELING DOWN, DEPRESSED OR HOPELESS: NOT AT ALL
SUM OF ALL RESPONSES TO PHQ9 QUESTIONS 1 & 2: 0
SUM OF ALL RESPONSES TO PHQ QUESTIONS 1-9: 0
SUM OF ALL RESPONSES TO PHQ QUESTIONS 1-9: 0

## 2024-10-28 ENCOUNTER — NURSE ONLY (OUTPATIENT)
Age: 39
End: 2024-10-28
Payer: COMMERCIAL

## 2024-10-28 VITALS — TEMPERATURE: 98.2 F

## 2024-10-28 DIAGNOSIS — Z23 NEEDS FLU SHOT: Primary | ICD-10-CM

## 2024-10-28 PROCEDURE — 90471 IMMUNIZATION ADMIN: CPT

## 2024-10-28 PROCEDURE — 90661 CCIIV3 VAC ABX FR 0.5 ML IM: CPT

## 2024-10-28 NOTE — PROGRESS NOTES
Patient was administered Flu shot in Lt via IM per Serena's orders, tolerated well.  Pt given copy of AVS and  to resume with any routine medications.  The VIIS information was given and reviewed with pt, states understanding at this time.

## 2024-11-18 RX ORDER — METRONIDAZOLE 7.5 MG/G
GEL VAGINAL
Qty: 70 G | Refills: 0 | OUTPATIENT
Start: 2024-11-18

## 2024-11-18 RX ORDER — ERGOCALCIFEROL 1.25 MG/1
50000 CAPSULE, LIQUID FILLED ORAL WEEKLY
Qty: 12 CAPSULE | Refills: 1 | Status: SHIPPED | OUTPATIENT
Start: 2024-11-18

## 2024-12-02 DIAGNOSIS — I10 ESSENTIAL (PRIMARY) HYPERTENSION: ICD-10-CM

## 2024-12-02 RX ORDER — AMLODIPINE BESYLATE 5 MG/1
5 TABLET ORAL 2 TIMES DAILY
Qty: 180 TABLET | Refills: 1 | Status: SHIPPED | OUTPATIENT
Start: 2024-12-02

## 2024-12-02 NOTE — TELEPHONE ENCOUNTER
PCP: Eleni Jack, APRN - NP    Last appt: 5/13/2024   Future Appointments   Date Time Provider Department Center   5/13/2025  3:00 PM Jag Cannon MD [facility]       Requested Prescriptions     Signed Prescriptions Disp Refills    amLODIPine (NORVASC) 5 MG tablet 180 tablet 1     Sig: Take 1 tablet by mouth 2 times daily     Authorizing Provider: JAG CANNON     Ordering User: JAILENE GARZA         Other Comments:  Verbal order per provider.  Order (medication, dose, route, frequency, amount, refills) repeated and verified twice.

## 2025-01-13 RX ORDER — ERGOCALCIFEROL 1.25 MG/1
50000 CAPSULE, LIQUID FILLED ORAL WEEKLY
Qty: 12 CAPSULE | Refills: 1 | Status: SHIPPED | OUTPATIENT
Start: 2025-01-13

## 2025-01-13 NOTE — TELEPHONE ENCOUNTER
Patient requesting refill on     Requested Prescriptions     Pending Prescriptions Disp Refills    vitamin D (ERGOCALCIFEROL) 1.25 MG (43025 UT) CAPS capsule [Pharmacy Med Name: Vitamin D2 1,250 mcg (50,000 unit) capsule] 12 capsule 1     Sig: TAKE 1 CAPSULE BY MOUTH ONCE A WEEK        Last OV 9/20/2024

## 2025-03-27 RX ORDER — ERGOCALCIFEROL 1.25 MG/1
50000 CAPSULE, LIQUID FILLED ORAL WEEKLY
Qty: 12 CAPSULE | Refills: 0 | Status: SHIPPED | OUTPATIENT
Start: 2025-03-27

## 2025-05-01 PROBLEM — I10 PRIMARY HYPERTENSION: Status: ACTIVE | Noted: 2021-08-30

## 2025-05-01 NOTE — PROGRESS NOTES
ASSESSMENT and PLAN  1. Chest discomfort  No recurrence.    2.  Primary hypertension  Well-controlled.  No changes recommended.      Follow-up in 12 months.  The patient has been instructed and agrees to call our office with any issues or other concerns related to their cardiac condition(s) and/or complaint(s).      CHIEF COMPLAINT  Routine follow-up    HPI:    Tosin Haines is a 40 y.o. female here for follow-up.  No cardiac issues have developed since the last visit on 5/13/2024.  She denies chest pain, dyspnea, palpitations or changes in exercise tolerance.  She denies heart failure symptoms, syncope or near syncope.  She wants to keep on top of her own heart health because of a history of high blood pressure and congestive heart failure in her mother.    PERTINENT CARDIAC HISTORY: (Records reviewed and summarized below)  Chest pain  ==> Echo 5/1/2009, EF 55%, 1+ MR, RV nl, PASP 17, bubble study negative  ==> Echo 7/22/2022, EF 65%, valves nl, RV nl  Ace inhibitor intolerance (cough)  Hypertension  Hyperlipidemia  Migraines  Chronic fatigue    Past medical history, past surgical history, family history, social history, and medications were all reviewed with the patient today and updated as necessary.     Current Outpatient Medications   Medication Sig    vitamin D (ERGOCALCIFEROL) 1.25 MG (66436 UT) CAPS capsule Take 1 capsule by mouth once a week    amLODIPine (NORVASC) 5 MG tablet Take 1 tablet by mouth 2 times daily    medroxyPROGESTERone (DEPO-PROVERA) 150 MG/ML injection INJECT 1ML INTO THE MUSCLE ONCE EVERY 3 MONTHS    fluticasone (FLONASE) 50 MCG/ACT nasal spray 2 sprays by Each Nostril route daily    famotidine (PEPCID) 20 MG tablet TAKE 1 TAB BY MOUTH AS NEEDED FOR GASTROESOPHAGEAL REFLUX DISEASE    naproxen (NAPROSYN) 500 MG tablet Take 1 tablet by mouth 2 times daily for 20 doses     No current facility-administered medications for this visit.     Allergies   Allergen Reactions    Amoxicillin

## 2025-05-05 RX ORDER — ERGOCALCIFEROL 1.25 MG/1
50000 CAPSULE, LIQUID FILLED ORAL WEEKLY
Qty: 12 CAPSULE | Refills: 0 | Status: SHIPPED | OUTPATIENT
Start: 2025-05-05

## 2025-05-13 ENCOUNTER — OFFICE VISIT (OUTPATIENT)
Age: 40
End: 2025-05-13
Payer: COMMERCIAL

## 2025-05-13 VITALS
WEIGHT: 191 LBS | SYSTOLIC BLOOD PRESSURE: 130 MMHG | DIASTOLIC BLOOD PRESSURE: 86 MMHG | BODY MASS INDEX: 30.7 KG/M2 | HEART RATE: 73 BPM | TEMPERATURE: 97.7 F | HEIGHT: 66 IN

## 2025-05-13 DIAGNOSIS — I10 PRIMARY HYPERTENSION: ICD-10-CM

## 2025-05-13 DIAGNOSIS — R07.89 CHEST DISCOMFORT: Primary | ICD-10-CM

## 2025-05-13 PROCEDURE — 3075F SYST BP GE 130 - 139MM HG: CPT | Performed by: INTERNAL MEDICINE

## 2025-05-13 PROCEDURE — 99213 OFFICE O/P EST LOW 20 MIN: CPT | Performed by: INTERNAL MEDICINE

## 2025-05-13 PROCEDURE — 3079F DIAST BP 80-89 MM HG: CPT | Performed by: INTERNAL MEDICINE

## 2025-05-13 PROCEDURE — 93000 ELECTROCARDIOGRAM COMPLETE: CPT | Performed by: INTERNAL MEDICINE

## 2025-05-13 ASSESSMENT — PATIENT HEALTH QUESTIONNAIRE - PHQ9
1. LITTLE INTEREST OR PLEASURE IN DOING THINGS: NOT AT ALL
SUM OF ALL RESPONSES TO PHQ QUESTIONS 1-9: 0
2. FEELING DOWN, DEPRESSED OR HOPELESS: NOT AT ALL
SUM OF ALL RESPONSES TO PHQ QUESTIONS 1-9: 0

## 2025-05-13 NOTE — PROGRESS NOTES
Identified pt with two pt identifiers(name and ). Reviewed record in preparation for visit and have obtained necessary documentation.  Chief Complaint   Patient presents with    Hypertension      /86 (BP Site: Left Upper Arm, Patient Position: Sitting, BP Cuff Size: Medium Adult)   Pulse 73   Temp 97.7 °F (36.5 °C) (Temporal)   Ht 1.676 m (5' 6\")   Wt 86.6 kg (191 lb)   BMI 30.83 kg/m²       Medications reviewed/approved by provider.      Health Maintenance Review: Patient reminded of \"due or due soon\" health maintenance. I have asked the patient to contact his/her primary care provider (PCP) for follow-up on his/her health maintenance.    Coordination of Care Questionnaire:  :   1) Have you been to an emergency room, urgent care, or hospitalized since your last visit?  If yes, where when, and reason for visit? no       2. Have seen or consulted any other health care provider since your last visit?   If yes, where when, and reason for visit?  no      Patient is accompanied by SELF I have received verbal consent from Tosin Haines to discuss any/all medical information while they are present in the room.

## 2025-05-21 RX ORDER — FAMOTIDINE 20 MG/1
TABLET, FILM COATED ORAL
Qty: 60 TABLET | Refills: 3 | Status: SHIPPED | OUTPATIENT
Start: 2025-05-21

## 2025-06-24 DIAGNOSIS — J30.1 SEASONAL ALLERGIC RHINITIS DUE TO POLLEN: ICD-10-CM

## 2025-06-24 RX ORDER — FLUTICASONE PROPIONATE 50 MCG
2 SPRAY, SUSPENSION (ML) NASAL DAILY
Qty: 48 G | Refills: 1 | Status: SHIPPED | OUTPATIENT
Start: 2025-06-24

## 2025-06-26 DIAGNOSIS — I10 ESSENTIAL (PRIMARY) HYPERTENSION: ICD-10-CM

## 2025-06-26 RX ORDER — AMLODIPINE BESYLATE 5 MG/1
5 TABLET ORAL 2 TIMES DAILY
Qty: 180 TABLET | Refills: 1 | Status: SHIPPED | OUTPATIENT
Start: 2025-06-26

## 2025-06-26 NOTE — TELEPHONE ENCOUNTER
PCP: Eleni Jack APRN - NP    Last appt: 5/13/2025   Future Appointments   Date Time Provider Department Center   7/7/2025  3:00 PM [facility]   5/12/2026 11:00 AM Jag Cannon MD [facility]       Requested Prescriptions     Signed Prescriptions Disp Refills    amLODIPine (NORVASC) 5 MG tablet 180 tablet 1     Sig: Take 1 tablet by mouth 2 times daily     Authorizing Provider: JAG CANNON     Ordering User: JAILENE GARZA         Other Comments:.  Verbal order per provider.  Order (medication, dose, route, frequency, amount, refills) repeated and verified twice.

## 2025-07-07 ENCOUNTER — OFFICE VISIT (OUTPATIENT)
Age: 40
End: 2025-07-07
Payer: COMMERCIAL

## 2025-07-07 VITALS
RESPIRATION RATE: 20 BRPM | TEMPERATURE: 98.6 F | HEART RATE: 95 BPM | WEIGHT: 194.4 LBS | SYSTOLIC BLOOD PRESSURE: 120 MMHG | BODY MASS INDEX: 31.24 KG/M2 | DIASTOLIC BLOOD PRESSURE: 60 MMHG | OXYGEN SATURATION: 99 % | HEIGHT: 66 IN

## 2025-07-07 DIAGNOSIS — L20.9 ATOPIC DERMATITIS, UNSPECIFIED TYPE: ICD-10-CM

## 2025-07-07 DIAGNOSIS — Z13.1 ENCOUNTER FOR SCREENING EXAMINATION FOR IMPAIRED GLUCOSE REGULATION AND DIABETES MELLITUS: ICD-10-CM

## 2025-07-07 DIAGNOSIS — Z00.00 WELLNESS EXAMINATION: Primary | ICD-10-CM

## 2025-07-07 DIAGNOSIS — E55.9 VITAMIN D DEFICIENCY: ICD-10-CM

## 2025-07-07 DIAGNOSIS — I10 PRIMARY HYPERTENSION: ICD-10-CM

## 2025-07-07 PROCEDURE — 36415 COLL VENOUS BLD VENIPUNCTURE: CPT | Performed by: NURSE PRACTITIONER

## 2025-07-07 PROCEDURE — 3078F DIAST BP <80 MM HG: CPT | Performed by: NURSE PRACTITIONER

## 2025-07-07 PROCEDURE — 99396 PREV VISIT EST AGE 40-64: CPT | Performed by: NURSE PRACTITIONER

## 2025-07-07 PROCEDURE — 3074F SYST BP LT 130 MM HG: CPT | Performed by: NURSE PRACTITIONER

## 2025-07-07 RX ORDER — TRIAMCINOLONE ACETONIDE 0.25 MG/G
OINTMENT TOPICAL
Qty: 80 G | Refills: 1 | Status: SHIPPED | OUTPATIENT
Start: 2025-07-07 | End: 2025-07-14

## 2025-07-07 SDOH — HEALTH STABILITY: PHYSICAL HEALTH: ON AVERAGE, HOW MANY DAYS PER WEEK DO YOU ENGAGE IN MODERATE TO STRENUOUS EXERCISE (LIKE A BRISK WALK)?: 7 DAYS

## 2025-07-07 SDOH — HEALTH STABILITY: MENTAL HEALTH: HOW MANY DRINKS CONTAINING ALCOHOL DO YOU HAVE ON A TYPICAL DAY WHEN YOU ARE DRINKING?: 1 OR 2

## 2025-07-07 SDOH — SOCIAL STABILITY: SOCIAL NETWORK: HOW OFTEN DO YOU ATTEND MEETINGS OF THE CLUBS OR ORGANIZATIONS YOU BELONG TO?: MORE THAN 4 TIMES PER YEAR

## 2025-07-07 SDOH — SOCIAL STABILITY: SOCIAL NETWORK: HOW OFTEN DO YOU GET TOGETHER WITH FRIENDS OR RELATIVES?: MORE THAN THREE TIMES A WEEK

## 2025-07-07 SDOH — SOCIAL STABILITY: SOCIAL INSECURITY: WITHIN THE LAST YEAR, HAVE YOU BEEN HUMILIATED OR EMOTIONALLY ABUSED IN OTHER WAYS BY YOUR PARTNER OR EX-PARTNER?: NO

## 2025-07-07 SDOH — HEALTH STABILITY: PHYSICAL HEALTH: ON AVERAGE, HOW MANY MINUTES DO YOU ENGAGE IN EXERCISE AT THIS LEVEL?: 150+ MIN

## 2025-07-07 SDOH — HEALTH STABILITY: MENTAL HEALTH: HOW OFTEN DO YOU HAVE A DRINK CONTAINING ALCOHOL?: MONTHLY OR LESS

## 2025-07-07 SDOH — SOCIAL STABILITY: SOCIAL INSECURITY
WITHIN THE LAST YEAR, HAVE YOU BEEN RAPED OR FORCED TO HAVE ANY KIND OF SEXUAL ACTIVITY BY YOUR PARTNER OR EX-PARTNER?: NO

## 2025-07-07 SDOH — SOCIAL STABILITY: SOCIAL INSECURITY: ARE YOU MARRIED, WIDOWED, DIVORCED, SEPARATED, NEVER MARRIED, OR LIVING WITH A PARTNER?: NEVER MARRIED

## 2025-07-07 SDOH — HEALTH STABILITY: MENTAL HEALTH
DO YOU FEEL STRESS - TENSE, RESTLESS, NERVOUS, OR ANXIOUS, OR UNABLE TO SLEEP AT NIGHT BECAUSE YOUR MIND IS TROUBLED ALL THE TIME - THESE DAYS?: TO SOME EXTENT

## 2025-07-07 SDOH — SOCIAL STABILITY: SOCIAL NETWORK
DO YOU BELONG TO ANY CLUBS OR ORGANIZATIONS SUCH AS CHURCH GROUPS, UNIONS, FRATERNAL OR ATHLETIC GROUPS, OR SCHOOL GROUPS?: NO

## 2025-07-07 SDOH — ECONOMIC STABILITY: FOOD INSECURITY: WITHIN THE PAST 12 MONTHS, YOU WORRIED THAT YOUR FOOD WOULD RUN OUT BEFORE YOU GOT THE MONEY TO BUY MORE.: NEVER TRUE

## 2025-07-07 SDOH — SOCIAL STABILITY: SOCIAL NETWORK: HOW OFTEN DO YOU ATTEND CHURCH OR RELIGIOUS SERVICES?: MORE THAN 4 TIMES PER YEAR

## 2025-07-07 SDOH — ECONOMIC STABILITY: FOOD INSECURITY: HOW HARD IS IT FOR YOU TO PAY FOR THE VERY BASICS LIKE FOOD, HOUSING, MEDICAL CARE, AND HEATING?: NOT HARD AT ALL

## 2025-07-07 SDOH — ECONOMIC STABILITY: FOOD INSECURITY: WITHIN THE PAST 12 MONTHS, THE FOOD YOU BOUGHT JUST DIDN'T LAST AND YOU DIDN'T HAVE MONEY TO GET MORE.: NEVER TRUE

## 2025-07-07 SDOH — ECONOMIC STABILITY: HOUSING INSECURITY: IN THE LAST 12 MONTHS, WAS THERE A TIME WHEN YOU WERE NOT ABLE TO PAY THE MORTGAGE OR RENT ON TIME?: NO

## 2025-07-07 SDOH — SOCIAL STABILITY: SOCIAL INSECURITY: WITHIN THE LAST YEAR, HAVE YOU BEEN AFRAID OF YOUR PARTNER OR EX-PARTNER?: NO

## 2025-07-07 SDOH — ECONOMIC STABILITY: TRANSPORTATION INSECURITY: IN THE PAST 12 MONTHS, HAS LACK OF TRANSPORTATION KEPT YOU FROM MEDICAL APPOINTMENTS OR FROM GETTING MEDICATIONS?: NO

## 2025-07-07 ASSESSMENT — PATIENT HEALTH QUESTIONNAIRE - PHQ9
SUM OF ALL RESPONSES TO PHQ QUESTIONS 1-9: 0
SUM OF ALL RESPONSES TO PHQ QUESTIONS 1-9: 0
2. FEELING DOWN, DEPRESSED OR HOPELESS: NOT AT ALL
SUM OF ALL RESPONSES TO PHQ QUESTIONS 1-9: 0
1. LITTLE INTEREST OR PLEASURE IN DOING THINGS: NOT AT ALL
SUM OF ALL RESPONSES TO PHQ QUESTIONS 1-9: 0

## 2025-07-07 ASSESSMENT — ACTIVITIES OF DAILY LIVING (ADL): LACK_OF_TRANSPORTATION: NO

## 2025-07-07 NOTE — PROGRESS NOTES
Chief Complaint   Patient presents with    Annual Exam       ASSESSMENT AND PLAN:       1. Wellness examination    - COLLECTION VENOUS BLOOD,VENIPUNCTURE  - CBC with Auto Differential; Future  - Comprehensive Metabolic Panel; Future  - Hemoglobin A1C; Future  - Vitamin D 25 Hydroxy; Future  - TSH; Future  - Lipid Panel; Future  - triamcinolone (KENALOG) 0.025 % ointment; Apply topically 2 times daily.  Dispense: 80 g; Refill: 1  - Lipid Panel  - TSH  - Vitamin D 25 Hydroxy  - Hemoglobin A1C  - Comprehensive Metabolic Panel  - CBC with Auto Differential    2. Primary hypertension    - COLLECTION VENOUS BLOOD,VENIPUNCTURE  - CBC with Auto Differential; Future  - Comprehensive Metabolic Panel; Future  - TSH; Future  - Lipid Panel; Future  - Lipid Panel  - TSH  - Comprehensive Metabolic Panel  - CBC with Auto Differential    3. Vitamin D deficiency    - COLLECTION VENOUS BLOOD,VENIPUNCTURE  - Vitamin D 25 Hydroxy; Future  - Vitamin D 25 Hydroxy    4. Atopic dermatitis, unspecified type    - triamcinolone (KENALOG) 0.025 % ointment; Apply topically 2 times daily.  Dispense: 80 g; Refill: 1    5. Encounter for screening examination for impaired glucose regulation and diabetes mellitus    - COLLECTION VENOUS BLOOD,VENIPUNCTURE  - Hemoglobin A1C; Future  - Hemoglobin A1C       Assessment & Plan  1. Routine checkup:   - Blood pressure readings are within normal range.  - Comprehensive blood work will be conducted today, including tests for vitamin D, thyroid function, cholesterol levels, and other basic parameters.  - [facility]    2. Bacterial vaginosis:   - Oral probiotics effective in managing symptoms.  - No recent episodes requiring antibiotics.    3. Birth control management:   - Using Depo-Provera with no issues.  - Irregular cycles with occasional spotting.  - Will continue Depo-Provera injections.    4. Hypertension: Stable.  - Takes amlodipine 5 mg twice

## 2025-07-07 NOTE — PROGRESS NOTES
Have you been to the ER, urgent care clinic since your last visit?  Hospitalized since your last visit?   NO    Have you seen or consulted any other health care providers outside our system since your last visit?   NO    Have you had a mammogram?”   NO    Date of last Mammogram: 1/11/2023      “Have you had a pap smear?”    NO    Date of last Cervical Cancer screen (HPV or PAP): 1/10/2020

## 2025-07-08 LAB
25(OH)D3 SERPL-MCNC: 55.4 NG/ML (ref 30–100)
ALBUMIN SERPL-MCNC: 3.4 G/DL (ref 3.5–5)
ALBUMIN/GLOB SERPL: 1 (ref 1.1–2.2)
ALP SERPL-CCNC: 100 U/L (ref 45–117)
ALT SERPL-CCNC: 17 U/L (ref 12–78)
ANION GAP SERPL CALC-SCNC: 8 MMOL/L (ref 2–12)
AST SERPL-CCNC: 17 U/L (ref 15–37)
BASOPHILS # BLD: 0.05 K/UL (ref 0–0.1)
BASOPHILS NFR BLD: 0.7 % (ref 0–1)
BILIRUB SERPL-MCNC: 0.5 MG/DL (ref 0.2–1)
BUN SERPL-MCNC: 9 MG/DL (ref 6–20)
BUN/CREAT SERPL: 8 (ref 12–20)
CALCIUM SERPL-MCNC: 9.3 MG/DL (ref 8.5–10.1)
CHLORIDE SERPL-SCNC: 110 MMOL/L (ref 97–108)
CHOLEST SERPL-MCNC: 190 MG/DL
CO2 SERPL-SCNC: 22 MMOL/L (ref 21–32)
CREAT SERPL-MCNC: 1.08 MG/DL (ref 0.55–1.02)
DIFFERENTIAL METHOD BLD: NORMAL
EOSINOPHIL # BLD: 0.12 K/UL (ref 0–0.4)
EOSINOPHIL NFR BLD: 1.7 % (ref 0–7)
ERYTHROCYTE [DISTWIDTH] IN BLOOD BY AUTOMATED COUNT: 14.2 % (ref 11.5–14.5)
EST. AVERAGE GLUCOSE BLD GHB EST-MCNC: 117 MG/DL
GLOBULIN SER CALC-MCNC: 3.4 G/DL (ref 2–4)
GLUCOSE SERPL-MCNC: 102 MG/DL (ref 65–100)
HBA1C MFR BLD: 5.7 % (ref 4–5.6)
HCT VFR BLD AUTO: 41.6 % (ref 35–47)
HDLC SERPL-MCNC: 52 MG/DL
HDLC SERPL: 3.7 (ref 0–5)
HGB BLD-MCNC: 12.5 G/DL (ref 11.5–16)
IMM GRANULOCYTES # BLD AUTO: 0.02 K/UL (ref 0–0.04)
IMM GRANULOCYTES NFR BLD AUTO: 0.3 % (ref 0–0.5)
LDLC SERPL CALC-MCNC: 115.8 MG/DL (ref 0–100)
LYMPHOCYTES # BLD: 1.61 K/UL (ref 0.8–3.5)
LYMPHOCYTES NFR BLD: 22.8 % (ref 12–49)
MCH RBC QN AUTO: 27.3 PG (ref 26–34)
MCHC RBC AUTO-ENTMCNC: 30 G/DL (ref 30–36.5)
MCV RBC AUTO: 90.8 FL (ref 80–99)
MONOCYTES # BLD: 0.61 K/UL (ref 0–1)
MONOCYTES NFR BLD: 8.7 % (ref 5–13)
NEUTS SEG # BLD: 4.64 K/UL (ref 1.8–8)
NEUTS SEG NFR BLD: 65.8 % (ref 32–75)
NRBC # BLD: 0 K/UL (ref 0–0.01)
NRBC BLD-RTO: 0 PER 100 WBC
PLATELET # BLD AUTO: 304 K/UL (ref 150–400)
PMV BLD AUTO: 12.1 FL (ref 8.9–12.9)
POTASSIUM SERPL-SCNC: 3.5 MMOL/L (ref 3.5–5.1)
PROT SERPL-MCNC: 6.8 G/DL (ref 6.4–8.2)
RBC # BLD AUTO: 4.58 M/UL (ref 3.8–5.2)
SODIUM SERPL-SCNC: 140 MMOL/L (ref 136–145)
TRIGL SERPL-MCNC: 111 MG/DL
TSH SERPL DL<=0.05 MIU/L-ACNC: 2.37 UIU/ML (ref 0.36–3.74)
VLDLC SERPL CALC-MCNC: 22.2 MG/DL
WBC # BLD AUTO: 7.1 K/UL (ref 3.6–11)

## 2025-08-18 ENCOUNTER — HOSPITAL ENCOUNTER (EMERGENCY)
Facility: HOSPITAL | Age: 40
Discharge: HOME | End: 2025-08-18
Attending: EMERGENCY MEDICINE
Payer: COMMERCIAL

## 2025-08-18 VITALS
WEIGHT: 188 LBS | BODY MASS INDEX: 30.22 KG/M2 | DIASTOLIC BLOOD PRESSURE: 94 MMHG | TEMPERATURE: 99 F | HEIGHT: 66 IN | RESPIRATION RATE: 16 BRPM | OXYGEN SATURATION: 100 % | SYSTOLIC BLOOD PRESSURE: 143 MMHG | HEART RATE: 95 BPM

## 2025-08-18 DIAGNOSIS — M79.651 ACUTE PAIN OF RIGHT THIGH: Primary | ICD-10-CM

## 2025-08-18 PROCEDURE — 99283 EMERGENCY DEPT VISIT LOW MDM: CPT

## 2025-08-18 RX ORDER — FLUCONAZOLE 150 MG/1
150 TABLET ORAL DAILY
COMMUNITY
Start: 2024-08-30

## 2025-08-18 RX ORDER — METHOCARBAMOL 500 MG/1
500 TABLET, FILM COATED ORAL 3 TIMES DAILY
Qty: 30 TABLET | Refills: 0 | Status: SHIPPED | OUTPATIENT
Start: 2025-08-18 | End: 2025-08-28

## 2025-08-18 ASSESSMENT — LIFESTYLE VARIABLES
HOW MANY STANDARD DRINKS CONTAINING ALCOHOL DO YOU HAVE ON A TYPICAL DAY: 1 OR 2
HOW OFTEN DO YOU HAVE A DRINK CONTAINING ALCOHOL: MONTHLY OR LESS

## 2025-08-18 ASSESSMENT — PAIN DESCRIPTION - LOCATION: LOCATION: OTHER (COMMENT)

## 2025-08-18 ASSESSMENT — PAIN DESCRIPTION - ONSET: ONSET: ON-GOING

## 2025-08-18 ASSESSMENT — PAIN SCALES - GENERAL: PAINLEVEL_OUTOF10: 0

## 2025-08-18 ASSESSMENT — PAIN - FUNCTIONAL ASSESSMENT
PAIN_FUNCTIONAL_ASSESSMENT: ACTIVITIES ARE NOT PREVENTED
PAIN_FUNCTIONAL_ASSESSMENT: 0-10

## 2025-08-18 ASSESSMENT — PAIN DESCRIPTION - DESCRIPTORS: DESCRIPTORS: SPASM

## 2025-08-18 ASSESSMENT — PAIN DESCRIPTION - FREQUENCY: FREQUENCY: INTERMITTENT

## 2025-08-18 ASSESSMENT — PAIN DESCRIPTION - ORIENTATION: ORIENTATION: RIGHT

## 2025-08-18 ASSESSMENT — PAIN DESCRIPTION - PAIN TYPE: TYPE: ACUTE PAIN

## 2025-08-19 ENCOUNTER — TRANSCRIBE ORDERS (OUTPATIENT)
Facility: HOSPITAL | Age: 40
End: 2025-08-19

## 2025-08-19 DIAGNOSIS — I82.4Y1 DEEP VEIN THROMBOSIS (DVT) OF PROXIMAL VEIN OF RIGHT LOWER EXTREMITY, UNSPECIFIED CHRONICITY (HCC): ICD-10-CM

## 2025-08-19 DIAGNOSIS — M79.651 RIGHT THIGH PAIN: Primary | ICD-10-CM

## 2025-08-26 ENCOUNTER — HOSPITAL ENCOUNTER (OUTPATIENT)
Facility: HOSPITAL | Age: 40
Discharge: HOME | End: 2025-08-29
Attending: EMERGENCY MEDICINE
Payer: COMMERCIAL

## 2025-08-26 DIAGNOSIS — M79.651 RIGHT THIGH PAIN: ICD-10-CM

## 2025-08-26 DIAGNOSIS — I82.4Y1 DEEP VEIN THROMBOSIS (DVT) OF PROXIMAL VEIN OF RIGHT LOWER EXTREMITY, UNSPECIFIED CHRONICITY (HCC): ICD-10-CM

## 2025-08-26 PROCEDURE — 93971 EXTREMITY STUDY: CPT
